# Patient Record
Sex: FEMALE | Race: WHITE | Employment: UNEMPLOYED | URBAN - METROPOLITAN AREA
[De-identification: names, ages, dates, MRNs, and addresses within clinical notes are randomized per-mention and may not be internally consistent; named-entity substitution may affect disease eponyms.]

---

## 2018-11-28 ENCOUNTER — APPOINTMENT (OUTPATIENT)
Dept: CT IMAGING | Age: 43
DRG: 897 | End: 2018-11-28

## 2018-11-28 ENCOUNTER — APPOINTMENT (OUTPATIENT)
Dept: GENERAL RADIOLOGY | Age: 43
DRG: 897 | End: 2018-11-28

## 2018-11-28 ENCOUNTER — HOSPITAL ENCOUNTER (INPATIENT)
Age: 43
LOS: 1 days | Discharge: HOME | DRG: 897 | End: 2018-11-29
Attending: EMERGENCY MEDICINE | Admitting: INTERNAL MEDICINE

## 2018-11-28 DIAGNOSIS — E87.6 HYPOKALEMIA: ICD-10-CM

## 2018-11-28 DIAGNOSIS — F10.929 ACUTE ALCOHOLIC INTOXICATION WITH COMPLICATION (HCC): Primary | ICD-10-CM

## 2018-11-28 PROBLEM — I10 HTN (HYPERTENSION): Status: ACTIVE | Noted: 2018-11-28

## 2018-11-28 PROBLEM — F10.221 ACUTE ALCOHOL INTOXICATION IN PATIENT WITH ALCOHOLISM WITH BLOOD ALCOHOL LEVEL OVER 0.3, WITH DELIRIUM (HCC): Status: ACTIVE | Noted: 2018-11-28

## 2018-11-28 PROBLEM — F41.9 ANXIETY: Status: ACTIVE | Noted: 2018-11-28
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
## 2018-11-28 LAB
A/G RATIO: 1 (ref 1.1–2.2)
ACETAMINOPHEN LEVEL: <5 UG/ML (ref 10–30)
ALBUMIN SERPL-MCNC: 3.7 G/DL (ref 3.4–5)
ALP BLD-CCNC: 89 U/L (ref 40–129)
ALT SERPL-CCNC: 19 U/L (ref 10–40)
AMMONIA: 26 UMOL/L (ref 11–51)
AMPHETAMINE SCREEN, URINE: NORMAL
ANION GAP SERPL CALCULATED.3IONS-SCNC: 17 MMOL/L (ref 3–16)
AST SERPL-CCNC: 20 U/L (ref 15–37)
BARBITURATE SCREEN URINE: NORMAL
BASOPHILS ABSOLUTE: 0 K/UL (ref 0–0.2)
BASOPHILS RELATIVE PERCENT: 0.5 %
BENZODIAZEPINE SCREEN, URINE: NORMAL
BILIRUB SERPL-MCNC: <0.2 MG/DL (ref 0–1)
BUN BLDV-MCNC: 7 MG/DL (ref 7–20)
CALCIUM SERPL-MCNC: 8.6 MG/DL (ref 8.3–10.6)
CANNABINOID SCREEN URINE: NORMAL
CHLORIDE BLD-SCNC: 103 MMOL/L (ref 99–110)
CO2: 23 MMOL/L (ref 21–32)
COCAINE METABOLITE SCREEN URINE: NORMAL
CREAT SERPL-MCNC: 0.5 MG/DL (ref 0.6–1.1)
EOSINOPHILS ABSOLUTE: 0.1 K/UL (ref 0–0.6)
EOSINOPHILS RELATIVE PERCENT: 1 %
ETHANOL: 412 MG/DL (ref 0–0.08)
GFR AFRICAN AMERICAN: >60
GFR NON-AFRICAN AMERICAN: >60
GLOBULIN: 3.7 G/DL
GLUCOSE BLD-MCNC: 100 MG/DL (ref 70–99)
HCT VFR BLD CALC: 41 % (ref 36–48)
HEMOGLOBIN: 13.5 G/DL (ref 12–16)
INR BLD: 0.93 (ref 0.86–1.14)
LIPASE: 50 U/L (ref 13–60)
LYMPHOCYTES ABSOLUTE: 4 K/UL (ref 1–5.1)
LYMPHOCYTES RELATIVE PERCENT: 53.5 %
Lab: NORMAL
MAGNESIUM: 2.1 MG/DL (ref 1.8–2.4)
MCH RBC QN AUTO: 30.4 PG (ref 26–34)
MCHC RBC AUTO-ENTMCNC: 32.9 G/DL (ref 31–36)
MCV RBC AUTO: 92.3 FL (ref 80–100)
METHADONE SCREEN, URINE: NORMAL
MONOCYTES ABSOLUTE: 0.6 K/UL (ref 0–1.3)
MONOCYTES RELATIVE PERCENT: 8.5 %
NEUTROPHILS ABSOLUTE: 2.7 K/UL (ref 1.7–7.7)
NEUTROPHILS RELATIVE PERCENT: 36.5 %
OPIATE SCREEN URINE: NORMAL
OXYCODONE URINE: NORMAL
PDW BLD-RTO: 13.8 % (ref 12.4–15.4)
PH UA: 5.5
PHENCYCLIDINE SCREEN URINE: NORMAL
PLATELET # BLD: 222 K/UL (ref 135–450)
PMV BLD AUTO: 8.7 FL (ref 5–10.5)
POTASSIUM REFLEX MAGNESIUM: 2.9 MMOL/L (ref 3.5–5.1)
PROPOXYPHENE SCREEN: NORMAL
PROTHROMBIN TIME: 10.6 SEC (ref 9.8–13)
RBC # BLD: 4.44 M/UL (ref 4–5.2)
SALICYLATE, SERUM: <0.3 MG/DL (ref 15–30)
SODIUM BLD-SCNC: 143 MMOL/L (ref 136–145)
TOTAL PROTEIN: 7.4 G/DL (ref 6.4–8.2)
WBC # BLD: 7.5 K/UL (ref 4–11)

## 2018-11-28 PROCEDURE — 2580000003 HC RX 258: Performed by: EMERGENCY MEDICINE

## 2018-11-28 PROCEDURE — 82140 ASSAY OF AMMONIA: CPT

## 2018-11-28 PROCEDURE — 80307 DRUG TEST PRSMV CHEM ANLYZR: CPT

## 2018-11-28 PROCEDURE — 96375 TX/PRO/DX INJ NEW DRUG ADDON: CPT

## 2018-11-28 PROCEDURE — 80053 COMPREHEN METABOLIC PANEL: CPT

## 2018-11-28 PROCEDURE — 2060000000 HC ICU INTERMEDIATE R&B

## 2018-11-28 PROCEDURE — G0480 DRUG TEST DEF 1-7 CLASSES: HCPCS

## 2018-11-28 PROCEDURE — 96361 HYDRATE IV INFUSION ADD-ON: CPT

## 2018-11-28 PROCEDURE — 83690 ASSAY OF LIPASE: CPT

## 2018-11-28 PROCEDURE — 6360000002 HC RX W HCPCS: Performed by: EMERGENCY MEDICINE

## 2018-11-28 PROCEDURE — 85025 COMPLETE CBC W/AUTO DIFF WBC: CPT

## 2018-11-28 PROCEDURE — 2500000003 HC RX 250 WO HCPCS: Performed by: PHYSICIAN ASSISTANT

## 2018-11-28 PROCEDURE — 2580000003 HC RX 258: Performed by: PHYSICIAN ASSISTANT

## 2018-11-28 PROCEDURE — 83735 ASSAY OF MAGNESIUM: CPT

## 2018-11-28 PROCEDURE — 93005 ELECTROCARDIOGRAM TRACING: CPT | Performed by: EMERGENCY MEDICINE

## 2018-11-28 PROCEDURE — 70450 CT HEAD/BRAIN W/O DYE: CPT

## 2018-11-28 PROCEDURE — 99285 EMERGENCY DEPT VISIT HI MDM: CPT

## 2018-11-28 PROCEDURE — 6360000002 HC RX W HCPCS: Performed by: PHYSICIAN ASSISTANT

## 2018-11-28 PROCEDURE — 2580000003 HC RX 258: Performed by: INTERNAL MEDICINE

## 2018-11-28 PROCEDURE — 71045 X-RAY EXAM CHEST 1 VIEW: CPT

## 2018-11-28 PROCEDURE — 85610 PROTHROMBIN TIME: CPT

## 2018-11-28 PROCEDURE — 96365 THER/PROPH/DIAG IV INF INIT: CPT

## 2018-11-28 RX ORDER — SODIUM CHLORIDE 0.9 % (FLUSH) 0.9 %
10 SYRINGE (ML) INJECTION PRN
Status: DISCONTINUED | OUTPATIENT
Start: 2018-11-28 | End: 2018-11-29 | Stop reason: HOSPADM

## 2018-11-28 RX ORDER — LORAZEPAM 1 MG/1
2 TABLET ORAL EVERY 4 HOURS PRN
Status: DISCONTINUED | OUTPATIENT
Start: 2018-11-28 | End: 2018-11-29 | Stop reason: HOSPADM

## 2018-11-28 RX ORDER — POTASSIUM CHLORIDE 7.45 MG/ML
10 INJECTION INTRAVENOUS PRN
Status: DISCONTINUED | OUTPATIENT
Start: 2018-11-28 | End: 2018-11-29 | Stop reason: HOSPADM

## 2018-11-28 RX ORDER — SODIUM CHLORIDE 0.9 % (FLUSH) 0.9 %
10 SYRINGE (ML) INJECTION EVERY 12 HOURS SCHEDULED
Status: DISCONTINUED | OUTPATIENT
Start: 2018-11-28 | End: 2018-11-29 | Stop reason: HOSPADM

## 2018-11-28 RX ORDER — THIAMINE MONONITRATE (VIT B1) 100 MG
100 TABLET ORAL DAILY
Status: DISCONTINUED | OUTPATIENT
Start: 2018-11-29 | End: 2018-11-29 | Stop reason: CLARIF

## 2018-11-28 RX ORDER — NALOXONE HYDROCHLORIDE 1 MG/ML
2 INJECTION INTRAMUSCULAR; INTRAVENOUS; SUBCUTANEOUS ONCE
Status: COMPLETED | OUTPATIENT
Start: 2018-11-28 | End: 2018-11-28

## 2018-11-28 RX ORDER — METOPROLOL SUCCINATE 25 MG/1
25 TABLET, EXTENDED RELEASE ORAL DAILY
Status: DISCONTINUED | OUTPATIENT
Start: 2018-11-29 | End: 2018-11-29 | Stop reason: HOSPADM

## 2018-11-28 RX ORDER — POTASSIUM CHLORIDE 7.45 MG/ML
10 INJECTION INTRAVENOUS
Status: DISPENSED | OUTPATIENT
Start: 2018-11-28 | End: 2018-11-28

## 2018-11-28 RX ORDER — FOLIC ACID 1 MG/1
1 TABLET ORAL DAILY
Status: DISCONTINUED | OUTPATIENT
Start: 2018-11-29 | End: 2018-11-29 | Stop reason: HOSPADM

## 2018-11-28 RX ORDER — POTASSIUM CHLORIDE 20 MEQ/1
40 TABLET, EXTENDED RELEASE ORAL PRN
Status: DISCONTINUED | OUTPATIENT
Start: 2018-11-28 | End: 2018-11-29 | Stop reason: HOSPADM

## 2018-11-28 RX ORDER — ONDANSETRON 2 MG/ML
4 INJECTION INTRAMUSCULAR; INTRAVENOUS EVERY 6 HOURS PRN
Status: DISCONTINUED | OUTPATIENT
Start: 2018-11-28 | End: 2018-11-29 | Stop reason: HOSPADM

## 2018-11-28 RX ORDER — 0.9 % SODIUM CHLORIDE 0.9 %
1000 INTRAVENOUS SOLUTION INTRAVENOUS ONCE
Status: COMPLETED | OUTPATIENT
Start: 2018-11-28 | End: 2018-11-28

## 2018-11-28 RX ORDER — SODIUM CHLORIDE 9 MG/ML
INJECTION, SOLUTION INTRAVENOUS CONTINUOUS
Status: DISCONTINUED | OUTPATIENT
Start: 2018-11-28 | End: 2018-11-29 | Stop reason: HOSPADM

## 2018-11-28 RX ORDER — LORAZEPAM 2 MG/ML
2 INJECTION INTRAMUSCULAR EVERY 4 HOURS PRN
Status: DISCONTINUED | OUTPATIENT
Start: 2018-11-28 | End: 2018-11-29 | Stop reason: HOSPADM

## 2018-11-28 RX ADMIN — FOLIC ACID: 5 INJECTION, SOLUTION INTRAMUSCULAR; INTRAVENOUS; SUBCUTANEOUS at 22:01

## 2018-11-28 RX ADMIN — NALOXONE HYDROCHLORIDE 2 MG: 1 INJECTION PARENTERAL at 19:10

## 2018-11-28 RX ADMIN — POTASSIUM CHLORIDE 10 MEQ: 7.46 INJECTION, SOLUTION INTRAVENOUS at 21:34

## 2018-11-28 RX ADMIN — SODIUM CHLORIDE 1000 ML: 9 INJECTION, SOLUTION INTRAVENOUS at 19:30

## 2018-11-28 RX ADMIN — Medication 10 ML: at 23:49

## 2018-11-29 VITALS
SYSTOLIC BLOOD PRESSURE: 149 MMHG | DIASTOLIC BLOOD PRESSURE: 92 MMHG | BODY MASS INDEX: 35.63 KG/M2 | WEIGHT: 213.85 LBS | TEMPERATURE: 98 F | HEIGHT: 65 IN | HEART RATE: 103 BPM | RESPIRATION RATE: 18 BRPM | OXYGEN SATURATION: 95 %

## 2018-11-29 LAB
A/G RATIO: 1 (ref 1.1–2.2)
ALBUMIN SERPL-MCNC: 3.2 G/DL (ref 3.4–5)
ALP BLD-CCNC: 73 U/L (ref 40–129)
ALT SERPL-CCNC: 27 U/L (ref 10–40)
ANION GAP SERPL CALCULATED.3IONS-SCNC: 12 MMOL/L (ref 3–16)
AST SERPL-CCNC: 40 U/L (ref 15–37)
ATYPICAL LYMPHOCYTE RELATIVE PERCENT: 12 % (ref 0–6)
BANDED NEUTROPHILS RELATIVE PERCENT: 1 % (ref 0–7)
BASOPHILS ABSOLUTE: 0 K/UL (ref 0–0.2)
BASOPHILS RELATIVE PERCENT: 1 %
BILIRUB SERPL-MCNC: <0.2 MG/DL (ref 0–1)
BILIRUBIN DIRECT: <0.2 MG/DL (ref 0–0.3)
BILIRUBIN, INDIRECT: ABNORMAL MG/DL (ref 0–1)
BUN BLDV-MCNC: 5 MG/DL (ref 7–20)
CALCIUM SERPL-MCNC: 7.5 MG/DL (ref 8.3–10.6)
CHLORIDE BLD-SCNC: 109 MMOL/L (ref 99–110)
CO2: 25 MMOL/L (ref 21–32)
CREAT SERPL-MCNC: <0.5 MG/DL (ref 0.6–1.1)
EKG ATRIAL RATE: 72 BPM
EKG DIAGNOSIS: NORMAL
EKG P AXIS: 45 DEGREES
EKG P-R INTERVAL: 138 MS
EKG Q-T INTERVAL: 408 MS
EKG QRS DURATION: 84 MS
EKG QTC CALCULATION (BAZETT): 446 MS
EKG R AXIS: 26 DEGREES
EKG T AXIS: 99 DEGREES
EKG VENTRICULAR RATE: 72 BPM
EOSINOPHILS ABSOLUTE: 0.1 K/UL (ref 0–0.6)
EOSINOPHILS RELATIVE PERCENT: 2 %
ETHANOL: 205 MG/DL (ref 0–0.08)
ETHANOL: NORMAL MG/DL (ref 0–0.08)
GFR AFRICAN AMERICAN: >60
GFR NON-AFRICAN AMERICAN: >60
GLOBULIN: 3.2 G/DL
GLUCOSE BLD-MCNC: 102 MG/DL (ref 70–99)
HCT VFR BLD CALC: 37.3 % (ref 36–48)
HEMATOLOGY PATH CONSULT: YES
HEMOGLOBIN: 12.1 G/DL (ref 12–16)
LYMPHOCYTES ABSOLUTE: 3.3 K/UL (ref 1–5.1)
LYMPHOCYTES RELATIVE PERCENT: 57 %
MCH RBC QN AUTO: 30.1 PG (ref 26–34)
MCHC RBC AUTO-ENTMCNC: 32.6 G/DL (ref 31–36)
MCV RBC AUTO: 92.4 FL (ref 80–100)
MONOCYTES ABSOLUTE: 0.1 K/UL (ref 0–1.3)
MONOCYTES RELATIVE PERCENT: 3 %
NEUTROPHILS ABSOLUTE: 1.2 K/UL (ref 1.7–7.7)
NEUTROPHILS RELATIVE PERCENT: 24 %
PDW BLD-RTO: 14 % (ref 12.4–15.4)
PLATELET # BLD: 212 K/UL (ref 135–450)
PLATELET SLIDE REVIEW: ADEQUATE
PMV BLD AUTO: 8.6 FL (ref 5–10.5)
POTASSIUM REFLEX MAGNESIUM: 3.6 MMOL/L (ref 3.5–5.1)
POTASSIUM SERPL-SCNC: 3.6 MMOL/L (ref 3.5–5.1)
RBC # BLD: 4.03 M/UL (ref 4–5.2)
SLIDE REVIEW: ABNORMAL
SODIUM BLD-SCNC: 146 MMOL/L (ref 136–145)
TOTAL CK: 72 U/L (ref 26–192)
TOTAL PROTEIN: 6.4 G/DL (ref 6.4–8.2)
WBC # BLD: 4.8 K/UL (ref 4–11)

## 2018-11-29 PROCEDURE — 2500000003 HC RX 250 WO HCPCS: Performed by: INTERNAL MEDICINE

## 2018-11-29 PROCEDURE — 85025 COMPLETE CBC W/AUTO DIFF WBC: CPT

## 2018-11-29 PROCEDURE — 2580000003 HC RX 258: Performed by: INTERNAL MEDICINE

## 2018-11-29 PROCEDURE — 80053 COMPREHEN METABOLIC PANEL: CPT

## 2018-11-29 PROCEDURE — G0480 DRUG TEST DEF 1-7 CLASSES: HCPCS

## 2018-11-29 PROCEDURE — 82550 ASSAY OF CK (CPK): CPT

## 2018-11-29 PROCEDURE — 6370000000 HC RX 637 (ALT 250 FOR IP): Performed by: INTERNAL MEDICINE

## 2018-11-29 PROCEDURE — 90686 IIV4 VACC NO PRSV 0.5 ML IM: CPT | Performed by: INTERNAL MEDICINE

## 2018-11-29 PROCEDURE — G0008 ADMIN INFLUENZA VIRUS VAC: HCPCS | Performed by: INTERNAL MEDICINE

## 2018-11-29 PROCEDURE — 93010 ELECTROCARDIOGRAM REPORT: CPT | Performed by: INTERNAL MEDICINE

## 2018-11-29 PROCEDURE — 36415 COLL VENOUS BLD VENIPUNCTURE: CPT

## 2018-11-29 PROCEDURE — 6360000002 HC RX W HCPCS: Performed by: INTERNAL MEDICINE

## 2018-11-29 RX ORDER — THIAMINE HCL 100 MG
100 TABLET ORAL DAILY
Status: DISCONTINUED | OUTPATIENT
Start: 2018-11-29 | End: 2018-11-29 | Stop reason: HOSPADM

## 2018-11-29 RX ORDER — THIAMINE HCL 100 MG
100 TABLET ORAL DAILY
Status: DISCONTINUED | OUTPATIENT
Start: 2018-11-29 | End: 2018-11-29 | Stop reason: SDUPTHER

## 2018-11-29 RX ADMIN — Medication 10 ML: at 10:52

## 2018-11-29 RX ADMIN — Medication 100 MG: at 17:01

## 2018-11-29 RX ADMIN — FOLIC ACID 1 MG: 1 TABLET ORAL at 10:51

## 2018-11-29 RX ADMIN — FOLIC ACID: 5 INJECTION, SOLUTION INTRAMUSCULAR; INTRAVENOUS; SUBCUTANEOUS at 10:52

## 2018-11-29 RX ADMIN — METOPROLOL SUCCINATE 25 MG: 25 TABLET, FILM COATED, EXTENDED RELEASE ORAL at 10:51

## 2018-11-29 RX ADMIN — SODIUM CHLORIDE: 9 INJECTION, SOLUTION INTRAVENOUS at 00:58

## 2018-11-29 RX ADMIN — INFLUENZA A VIRUS A/MICHIGAN/45/2015 X-275 (H1N1) ANTIGEN (FORMALDEHYDE INACTIVATED), INFLUENZA A VIRUS A/SINGAPORE/INFIMH-16-0019/2016 IVR-186 (H3N2) ANTIGEN (FORMALDEHYDE INACTIVATED), INFLUENZA B VIRUS B/PHUKET/3073/2013 ANTIGEN (FORMALDEHYDE INACTIVATED), AND INFLUENZA B VIRUS B/MARYLAND/15/2016 BX-69A ANTIGEN (FORMALDEHYDE INACTIVATED) 0.5 ML: 15; 15; 15; 15 INJECTION, SUSPENSION INTRAMUSCULAR at 17:04

## 2018-11-29 RX ADMIN — ENOXAPARIN SODIUM 40 MG: 40 INJECTION SUBCUTANEOUS at 10:51

## 2018-11-29 ASSESSMENT — PAIN SCALES - GENERAL
PAINLEVEL_OUTOF10: 0

## 2018-11-29 NOTE — H&P
History and Physical - Observation  Dr. Kian Croft  11/28/2018    PCP: Lisandra Menjivar MD    Cc:   Chief Complaint   Patient presents with    Drug Overdose     pt in via squad, found slumped over in car. given 6 mg Narcan, 2 IN and 4 IV. minimal response.  via squad. HPI:  Karis Kurtz is a 39 y.o. female who  has a past medical history of Anxiety and Migraine. Patient presents with Acute alcohol intoxication ([facility] [address] HPI    39 y.o. female that presents after being found unresponsive in the  seat of her car for evaluation of possible overdose. Upon further chart review, and consultation with patient's mother, patient does have history of alcohol abuse. No known history of other illicit drug use including opioids. She received total of 8 mg of Narcan IM and IV without significant response    Cannot get any further hx or ROS from pt due to intoxication    Problem list of hospitalization thus far: Active Hospital Problems    Diagnosis    Acute alcohol intoxication ([facility] [address] [F10.929]    Anxiety [F41.9]    HTN (hypertension) [I10]    Hypokalemia [E87.6]         Review of Systems: (1 system for EPF, 2-9 for detailed, 10+ for comprehensive)  Review of Systems   Unable to perform ROS: Mental status change          Past MedicalHistory:   Past Medical History:   Diagnosis Date    Anxiety     Migraine        Past Surgical History:   Past Surgical History:   Procedure Laterality Date    ECTOPIC PREGNANCY SURGERY      right tube removed       Social History:   Social History     Social History    Marital status:      Spouse name: N/A    Number of children: N/A    Years of education: N/A     Occupational History    Not on file.      Social History Main Topics    Smoking status: Never Smoker    Smokeless tobacco: Not on file    Alcohol use No    Drug use: No    Sexual activity: Not on file     Other Topics Concern    Not on file     Social History Narrative    No narrative on file CHEST 11/28/2018 8:00 pm COMPARISON: None. HISTORY: ORDERING SYSTEM PROVIDED HISTORY: shortness of breath TECHNOLOGIST PROVIDED HISTORY: Reason for exam:->shortness of breath Ordering Physician Provided Reason for Exam: Drug Overdose (pt in via squad, found slumped over in car. given 6 mg Narcan, 2 IN and 4 IV. minimal response.  via squad. ) Acuity: Unknown Type of Exam: Unknown FINDINGS: The heart size is enlarged. The pulmonary vasculature is mildly congested. No acute infiltrates are seen. No pneumothoraces are noted. 1. Cardiomegaly with mild vascular congestion. EKG:            MEDICAL DECISION MAKING:    Patient Active Hospital Problem List:   Acute alcohol intoxication ([facility] [address] (11/28/2018)    Assessment: New Problem to me. pt with etoh level > 400    Plan: admit. Social work to see. Needs rehab. Banana bag iv daily. Ivf. Keep npo until more alert. Check urine tox screen   Anxiety (11/28/2018)    Assessment: Established problem. Stable. Plan: Continue present orders/plan. HTN (hypertension) (11/28/2018)    Assessment: Established problem. Stable. 116/62    Plan: Continue present orders/plan. Hypokalemia (11/28/2018)    Assessment: New Problem to me.  k low in er    Plan: kcl ordered iv          Diagnoses as listed above, designated as new or established and plan outlined for each. The patient isbeing placed in observation status with the expectation of NOT requiring a hospital stay spanning at least two midnights for care and treatment of the problems noted in the problem list.  This determination is alsobased on the patients comorbidities and past medical history, the severity and timing of the signs and symptoms upon presentation.         Electronically signed by: Radha Koroma 11/28/2018

## 2018-11-29 NOTE — PROGRESS NOTES
mg, Subcutaneous, Daily  LORazepam (ATIVAN) injection 2 mg, 2 mg, Intravenous, Q4H PRN  LORazepam (ATIVAN) tablet 2 mg, 2 mg, Oral, Q4H PRN  potassium chloride (KLOR-CON M) extended release tablet 40 mEq, 40 mEq, Oral, PRN **OR** potassium bicarb-citric acid (EFFER-K) effervescent tablet 40 mEq, 40 mEq, Oral, PRN **OR** potassium chloride 10 mEq/100 mL IVPB (Peripheral Line), 10 mEq, Intravenous, PRN  vitamin B-1 (THIAMINE) tablet 100 mg, 100 mg, Oral, Daily  folic acid (FOLVITE) tablet 1 mg, 1 mg, Oral, Daily         Objective:  BP (!) 117/58   Pulse 93   Temp 97.2 °F (36.2 °C) (Temporal)   Resp 18   Ht 5' 5\" (1.651 m)   Wt 213 lb 13.5 oz (97 kg)   SpO2 93%   BMI 35.59 kg/m²      Patient Vitals for the past 24 hrs:   BP Temp Temp src Pulse Resp SpO2 Height Weight   11/29/18 0700 (!) 117/58 - - 93 - 93 % - -   11/29/18 0600 116/66 - - 96 18 95 % - 213 lb 13.5 oz (97 kg)   11/29/18 0500 106/65 - - 84 17 93 % - -   11/29/18 0400 (!) 105/55 97.2 °F (36.2 °C) Temporal 90 18 96 % - -   11/29/18 0300 (!) 105/56 - - 86 14 91 % - -   11/29/18 0200 (!) 116/56 - - 91 14 98 % - -   11/29/18 0100 113/71 - - 82 14 100 % - -   11/29/18 0045 - - - - - 98 % - -   11/29/18 0000 111/70 97.9 °F (36.6 °C) Temporal 76 13 95 % - -   11/28/18 2315 115/66 97.9 °F (36.6 °C) Temporal 78 15 99 % - 211 lb 6.7 oz (95.9 kg)   11/28/18 2230 116/73 - - 87 20 93 % - -   11/28/18 2215 114/79 - - 96 20 97 % - -   11/28/18 2212 - - - 94 20 98 % - -   11/28/18 2150 119/70 - - 91 21 96 % - -   11/28/18 2130 120/63 - - - - 96 % - -   11/28/18 2115 (!) 128/51 - - - - 91 % - -   11/28/18 2103 - - - 86 13 94 % - -   11/28/18 2102 116/62 98 °F (36.7 °C) - 94 19 - - -   11/28/18 2049 (!) 138/48 - - 102 21 96 % - -   11/28/18 2015 (!) 99/39 - - 81 13 (!) 86 % - -   11/28/18 1953 - - - 86 21 100 % - -   11/28/18 1930 94/71 - - 90 20 96 % - -   11/28/18 1918 106/66 - - 81 18 - - -   11/28/18 1916 - - Oral 71 12 100 % 5' 5\" (1.651 m) 215 lb (97.5 kg)

## 2018-11-29 NOTE — ED PROVIDER NOTES
Emergency [address] EMERGENCY DEPARTMENT    Patient: Judge Haro  MRN: [id number]  : 1975  Date of Evaluation: 2018  ED Provider: Valentino Stark, MD    Chief Complaint     No chief complaint on file. Josh Souza is a 39 y.o. female who presents to the emergency department   This is a 70-year-old female brought to emergency department for evaluation of change in mental status. According to EMS patient brought to emergency department after being found in a field after reportedly driving erratically. She was acting confused and somnolent. She was given 6 of Narcan in the field with no significant improvement. No reported trauma unknown if she's had recent changes of diabetic medications no reported fevers. ROS:     At least 10 systems reviewed and otherwise acutely negative except as in the [address] Past History     Past Medical History:   Diagnosis Date    Anxiety     Migraine      Past Surgical History:   Procedure Laterality Date    ECTOPIC PREGNANCY SURGERY      right tube removed     Social History     Social History    Marital status:      Spouse name: N/A    Number of children: N/A    Years of education: N/A     Social History Main Topics    Smoking status: Never Smoker    Smokeless tobacco: Not on file    Alcohol use No    Drug use: No    Sexual activity: Not on file     Other Topics Concern    Not on file     Social History Narrative    No narrative on file       Medications/Allergies     Previous Medications    ALPRAZOLAM (XANAX) 2 MG TABLET    Take 2 mg by mouth daily. METOPROLOL (TOPROL-XL) 25 MG XL TABLET    Take 25 mg by mouth daily.        Allergies   Allergen Reactions    Latex         Physical Exam       ED Triage Vitals   BP Temp Temp src Pulse Resp SpO2 Height Weight   -- -- -- -- -- -- -- --     General: Well-appearing well-nourished female in no acute distress  HEENT:  PERRLA, oropharynx moist evidence of trauma noted on examination. Did review patient's previous visits to the emergency room. Does have evidence of alcohol abuse with similar presentations in the past.    ED Medication Orders     Start Ordered     Status Ordering Provider    11/28/18 2130 11/28/18 2117  sodium chloride 0.9 % 5,794 mL with folic acid 1 mg, adult multi-vitamin with vitamin k 10 mL, thiamine 100 mg  ONCE      Ordered JESI ARMSTRONG    11/28/18 2015 11/28/18 2008  potassium chloride 10 mEq/100 mL IVPB (Peripheral Line)  EVERY HOUR      Acknowledged KHUSHBOO GILMORE    11/28/18 1930 11/28/18 1915  0.9 % sodium chloride bolus  ONCE      Last MAR action:  New Bag - by Ralph Guerni on 11/28/18 at [address] [place], [place] [address]    11/28/18 1915 11/28/18 1913  naloxone (NARCAN) injection 2 mg  ONCE      Last MAR action:  Given - by Ralph Guerin on 11/28/18 at [facility]          Final Impression    No diagnosis found.   DISPOSITION       (Please note that portions of this note may have been completed with a voice recognition program. Efforts were made to edit the dictations but occasionally words are mis-transcribed.)    Km Canela MD  [address] [facility]       Km Canela MD  11/28/18 2119

## 2018-11-29 NOTE — ED NOTES
Bed alarm on and in place, shane wyatt at bedside to keep patient safe.       Krystle Rees RN  11/28/18 0375

## 2018-11-29 NOTE — ED PROVIDER NOTES
[address] [facility]  eMERGENCY dEPARTMENT eNCOUnter        Pt Name: Laura Gonzalez  MRN: [id number]  [place] 1975  Date of evaluation: 11/28/2018  Provider: John Benitez PA-C  PCP: Alyx Farias MD    This patient was seen and evaluated by the attending physician Konrad Gutierrez MD.        [address] [place] [address]       Chief Complaint   Patient presents with    Drug Overdose     pt in via squad, found slumped over in car. given 6 mg Narcan, 2 IN and 4 IV. minimal response.  via squad. HISTORY OF PRESENT ILLNESS   (Location/Symptom, Timing/Onset, Context/Setting, Quality, Duration, Modifying Factors, Severity)  Note limiting factors. Laura Gonzalez is a 39 y.o. female that presents after being found unresponsive in the  seat of her car for evaluation of possible overdose. Upon further chart review, and consultation with patient's mother, patient does have history of alcohol abuse. No known history of other illicit drug use including opioids. She received total of 8 mg of Narcan IM and IV without significant response. No additional information able to be obtained at this time. Nursing Notes were all reviewed and agreed with or any disagreements were addressed  in the HPI. REVIEW OF SYSTEMS    (2-9 systems for level 4, 10 or more for level 5)     Review of Systems   Unable to perform ROS: Patient unresponsive       Positives and Pertinent negatives as per HPI. Except as noted abovein the ROS, all other systems were reviewed and negative. PAST MEDICAL HISTORY     Past Medical History:   Diagnosis Date    Anxiety     Migraine          SURGICAL HISTORY     Past Surgical History:   Procedure Laterality Date    ECTOPIC PREGNANCY SURGERY      right tube removed         CURRENTMEDICATIONS       Previous Medications    ALPRAZOLAM (XANAX) 2 MG TABLET    Take 2 mg by mouth daily.       METOPROLOL (TOPROL-XL) 25 MG XL TABLET    Take 25 mg by mouth daily. ALLERGIES     Latex    FAMILYHISTORY     No family history on file. SOCIAL HISTORY       Social History     Social History    Marital status:      Spouse name: N/A    Number of children: N/A    Years of education: N/A     Social History Main Topics    Smoking status: Never Smoker    Smokeless tobacco: Not on file    Alcohol use No    Drug use: No    Sexual activity: Not on file     Other Topics Concern    Not on file     Social History Narrative    No narrative on file       SCREENINGS             PHYSICAL EXAM    (up to 7 for level 4, 8 or more for level 5)     ED Triage Vitals [11/28/18 1916]   BP Temp Temp Source Pulse Resp SpO2 Height Weight   -- -- Oral 71 12 100 % 5' 5\" (1.651 m) 215 lb (97.5 kg)       Physical Exam   Constitutional: She appears well-developed and well-nourished. HENT:   Head: Normocephalic and atraumatic. Right Ear: External ear normal.   Left Ear: External ear normal.   Nose: Nose normal.   Eyes: Right eye exhibits no discharge. Left eye exhibits no discharge. Neck: Normal range of motion. Neck supple. Cardiovascular: Normal rate, regular rhythm and normal heart sounds. No murmur heard. Pulmonary/Chest: Effort normal and breath sounds normal. No respiratory distress. She has no wheezes. She has no rales. Abdominal: Soft. Musculoskeletal: Normal range of motion. Neurological: She is unresponsive. Skin: Skin is warm and dry. She is not diaphoretic. Psychiatric: She has a normal mood and affect. Her behavior is normal.   Nursing note and vitals reviewed.       DIAGNOSTIC RESULTS   LABS:    Labs Reviewed   COMPREHENSIVE METABOLIC PANEL W/ REFLEX TO MG FOR LOW K - Abnormal; Notable for the following:        Result Value    Potassium reflex Magnesium 2.9 (*)     Anion Gap 17 (*)     Glucose 100 (*)     CREATININE 0.5 (*)     Albumin/Globulin Ratio 1.0 (*)     All other components within normal limits    Narrative:     CALL  [place]  [facility] tel. [phone],  Chemistry results called to and read back by MARY ELLEN Reaves, 11/28/2018  20:06, by Yariel Yang  Performed at:  [facility] [address], [facility], [address]   Phone [phone]   SALICYLATE LEVEL - Abnormal; Notable for the following:     Salicylate, Serum <0.7 (*)     All other components within normal limits    Narrative:     Kit August  [facility] tel. [phone],  Chemistry results called to and read back by MARY ELLEN Reaves, 11/28/2018  20:06, by Yariel Yang  Performed at:  [facility] [address], [facility], [address]   Phone [phone]   ACETAMINOPHEN LEVEL - Abnormal; Notable for the following:     Acetaminophen Level <5 (*)     All other components within normal limits    Narrative:     Kit Traore  [facility] tel. [phone],  Chemistry results called to and read back by MARY ELLEN Reaves, 11/28/2018  20:06, by Yariel Yang  Performed at:  [facility] [address], [facility], [address]   Phone [phone]   CBC WITH AUTO DIFFERENTIAL    Narrative:     Performed at:  [facility] [address], [facility], [address]   Phone [phone]   LIPASE    Narrative:     Kit Traore  [facility] tel. [phone],  Chemistry results called to and read back by MARY ELLEN Reaves, 11/28/2018  20:06, by Yariel Yang  Performed at:  [facility] [address], [facility], [address]   Phone [phone]   PROTIME-INR    Narrative:     Performed at:  [facility] [address], [facility], [address]   Phone [phone]   AMMONIA    Narrative:     Performed at:  [facility] [address], [facility], [address]   Phone [phone]   URINE DRUG SCREEN    Narrative:     Performed at:  [facility] [address], [facility], [address]   Phone [phone]   ETHANOL

## 2018-11-29 NOTE — ED NOTES
[place], [place] at bedside. Pt waking up and pulling at lines, unable to redirect. New order for bilateral soft wrist restraints.       Jaclyn Dugan RN  11/28/18 2417

## 2018-12-07 NOTE — DISCHARGE SUMMARY
[facility]    Imaging Results:  Ct Head Wo Contrast    Result Date: 11/28/2018  EXAMINATION: CT OF THE HEAD WITHOUT CONTRAST  11/28/2018 8:46 pm TECHNIQUE: CT of the head was performed without the administration of intravenous contrast. Dose modulation, iterative reconstruction, and/or weight based adjustment of the mA/kV was utilized to reduce the radiation dose to as low as reasonably achievable. COMPARISON: None. HISTORY: ORDERING SYSTEM PROVIDED HISTORY: CONFUSION/DELIRIUM, ALTERED LOC, UNEXPLAINED TECHNOLOGIST PROVIDED HISTORY: Has a \"code stroke\" or \"stroke alert\" been called? ->No Ordering Physician Provided Reason for Exam: Drug Overdose (pt in via squad, found slumped over in car. given 6 mg Narcan, 2 IN and 4 IV. minimal response.  via squad. ) FINDINGS: BRAIN/VENTRICLES: There is no acute intracranial hemorrhage, mass effect or midline shift. No abnormal extra-axial fluid collection. The gray-white differentiation is maintained without evidence of an acute infarct. There is no evidence of hydrocephalus. ORBITS: The visualized portion of the orbits demonstrate no acute abnormality. SINUSES: The visualized paranasal sinuses and mastoid air cells demonstrate no acute abnormality. SOFT TISSUES/SKULL:  No acute abnormality of the visualized skull or soft tissues. No acute intracranial abnormality. Xr Chest Portable    Result Date: 11/28/2018  EXAMINATION: SINGLE XRAY VIEW OF THE CHEST 11/28/2018 8:00 pm COMPARISON: None. HISTORY: ORDERING SYSTEM PROVIDED HISTORY: shortness of breath TECHNOLOGIST PROVIDED HISTORY: Reason for exam:->shortness of breath Ordering Physician Provided Reason for Exam: Drug Overdose (pt in via squad, found slumped over in car. given 6 mg Narcan, 2 IN and 4 IV. minimal response.  via squad. ) Acuity: Unknown Type of Exam: Unknown FINDINGS: The heart size is enlarged. The pulmonary vasculature is mildly congested. No acute infiltrates are seen.   No pneumothoraces are noted.     1. Cardiomegaly with mild vascular congestion. Discharge Exam:  BP (!) 149/92   Pulse 103   Temp 98 °F (36.7 °C)   Resp 18   Ht 5' 5\" (1.651 m)   Wt 213 lb 13.5 oz (97 kg)   SpO2 95%   BMI 35.59 kg/m²   General appearance: alert, appears stated age and cooperative  Head: Normocephalic, without obvious abnormality, atraumatic  Lungs: clear to auscultation bilaterally  Heart: regular rate and rhythm, S1, S2 normal, no murmur, click, rub or gallop  Abdomen: soft, non-tender; bowel sounds normal; no masses,  no organomegaly  Extremities: extremities normal, atraumatic, no cyanosis or edema  Pulses: 2+ and symmetric    Disposition: home    Condition: stable    Discharge Medications:   Eneida Berman   Home Medication Instructions [id number]    Printed on:12/07/18 5467   Medication Information                      alprazolam (XANAX) 2 MG tablet  Take 2 mg by mouth daily. metoprolol (TOPROL-XL) 25 MG XL tablet  Take 25 mg by mouth daily. Allergies: Allergies   Allergen Reactions    Latex Itching       Follow up Instructions: Follow-up with PCP: Giovani Park MD in 2-4 wk .       Total time spent on day of discharge including face-to-face visit, examination, documentation, counseling, preparation of discharge plans and followup, and discharge medicine reconciliation and presciptions is 33 minutes    Signed:  Percy Bradley MD  12/7/2018

## 2020-03-25 ENCOUNTER — HOSPITAL ENCOUNTER (EMERGENCY)
Age: 45
Discharge: HOME | End: 2020-03-26
Attending: EMERGENCY MEDICINE
Payer: MEDICARE

## 2020-03-25 ENCOUNTER — APPOINTMENT (OUTPATIENT)
Dept: CT IMAGING | Age: 45
End: 2020-03-25
Payer: MEDICARE

## 2020-03-25 ENCOUNTER — APPOINTMENT (OUTPATIENT)
Dept: GENERAL RADIOLOGY | Age: 45
End: 2020-03-25
Payer: MEDICARE

## 2020-03-25 LAB
ANION GAP SERPL CALCULATED.3IONS-SCNC: 13 MMOL/L (ref 3–16)
BASOPHILS ABSOLUTE: 0.1 K/UL (ref 0–0.2)
BASOPHILS RELATIVE PERCENT: 0.6 %
BUN BLDV-MCNC: 8 MG/DL (ref 7–20)
CALCIUM SERPL-MCNC: 8.7 MG/DL (ref 8.3–10.6)
CHLORIDE BLD-SCNC: 101 MMOL/L (ref 99–110)
CO2: 23 MMOL/L (ref 21–32)
CREAT SERPL-MCNC: 0.5 MG/DL (ref 0.6–1.1)
EOSINOPHILS ABSOLUTE: 0.2 K/UL (ref 0–0.6)
EOSINOPHILS RELATIVE PERCENT: 1.7 %
ETHANOL: 358 MG/DL (ref 0–0.08)
GFR AFRICAN AMERICAN: >60
GFR NON-AFRICAN AMERICAN: >60
GLUCOSE BLD-MCNC: 106 MG/DL (ref 70–99)
HCT VFR BLD CALC: 38.4 % (ref 36–48)
HEMOGLOBIN: 12.6 G/DL (ref 12–16)
LYMPHOCYTES ABSOLUTE: 3.4 K/UL (ref 1–5.1)
LYMPHOCYTES RELATIVE PERCENT: 37.7 %
MAGNESIUM: 2.4 MG/DL (ref 1.8–2.4)
MCH RBC QN AUTO: 31.1 PG (ref 26–34)
MCHC RBC AUTO-ENTMCNC: 32.8 G/DL (ref 31–36)
MCV RBC AUTO: 94.8 FL (ref 80–100)
MONOCYTES ABSOLUTE: 0.5 K/UL (ref 0–1.3)
MONOCYTES RELATIVE PERCENT: 5.4 %
NEUTROPHILS ABSOLUTE: 5 K/UL (ref 1.7–7.7)
NEUTROPHILS RELATIVE PERCENT: 54.6 %
PDW BLD-RTO: 13.9 % (ref 12.4–15.4)
PLATELET # BLD: 224 K/UL (ref 135–450)
PMV BLD AUTO: 8.7 FL (ref 5–10.5)
POTASSIUM REFLEX MAGNESIUM: 3.3 MMOL/L (ref 3.5–5.1)
PRO-BNP: 55 PG/ML (ref 0–124)
RBC # BLD: 4.06 M/UL (ref 4–5.2)
SODIUM BLD-SCNC: 137 MMOL/L (ref 136–145)
TROPONIN: <0.01 NG/ML
WBC # BLD: 9.2 K/UL (ref 4–11)

## 2020-03-25 PROCEDURE — 99284 EMERGENCY DEPT VISIT MOD MDM: CPT

## 2020-03-25 PROCEDURE — 70450 CT HEAD/BRAIN W/O DYE: CPT

## 2020-03-25 PROCEDURE — 2500000003 HC RX 250 WO HCPCS: Performed by: NURSE PRACTITIONER

## 2020-03-25 PROCEDURE — 80048 BASIC METABOLIC PNL TOTAL CA: CPT

## 2020-03-25 PROCEDURE — 83735 ASSAY OF MAGNESIUM: CPT

## 2020-03-25 PROCEDURE — 84484 ASSAY OF TROPONIN QUANT: CPT

## 2020-03-25 PROCEDURE — 85025 COMPLETE CBC W/AUTO DIFF WBC: CPT

## 2020-03-25 PROCEDURE — G0480 DRUG TEST DEF 1-7 CLASSES: HCPCS

## 2020-03-25 PROCEDURE — 83880 ASSAY OF NATRIURETIC PEPTIDE: CPT

## 2020-03-25 PROCEDURE — 2580000003 HC RX 258: Performed by: NURSE PRACTITIONER

## 2020-03-25 PROCEDURE — 36415 COLL VENOUS BLD VENIPUNCTURE: CPT

## 2020-03-25 PROCEDURE — 93005 ELECTROCARDIOGRAM TRACING: CPT | Performed by: NURSE PRACTITIONER

## 2020-03-25 PROCEDURE — 6360000002 HC RX W HCPCS: Performed by: NURSE PRACTITIONER

## 2020-03-25 RX ADMIN — FOLIC ACID: 5 INJECTION, SOLUTION INTRAMUSCULAR; INTRAVENOUS; SUBCUTANEOUS at 22:40

## 2020-03-26 ENCOUNTER — APPOINTMENT (OUTPATIENT)
Dept: GENERAL RADIOLOGY | Age: 45
End: 2020-03-26
Payer: MEDICARE

## 2020-03-26 VITALS
OXYGEN SATURATION: 98 % | HEIGHT: 65 IN | BODY MASS INDEX: 35.59 KG/M2 | DIASTOLIC BLOOD PRESSURE: 70 MMHG | HEART RATE: 79 BPM | SYSTOLIC BLOOD PRESSURE: 120 MMHG | RESPIRATION RATE: 18 BRPM | TEMPERATURE: 98 F

## 2020-03-26 LAB
EKG ATRIAL RATE: 89 BPM
EKG DIAGNOSIS: NORMAL
EKG P AXIS: 61 DEGREES
EKG P-R INTERVAL: 132 MS
EKG Q-T INTERVAL: 400 MS
EKG QRS DURATION: 84 MS
EKG QTC CALCULATION (BAZETT): 486 MS
EKG R AXIS: 70 DEGREES
EKG T AXIS: 103 DEGREES
EKG VENTRICULAR RATE: 89 BPM

## 2020-03-26 PROCEDURE — 2580000003 HC RX 258: Performed by: EMERGENCY MEDICINE

## 2020-03-26 PROCEDURE — 71045 X-RAY EXAM CHEST 1 VIEW: CPT

## 2020-03-26 PROCEDURE — 93010 ELECTROCARDIOGRAM REPORT: CPT | Performed by: INTERNAL MEDICINE

## 2020-03-26 PROCEDURE — 96374 THER/PROPH/DIAG INJ IV PUSH: CPT

## 2020-03-26 PROCEDURE — 6360000002 HC RX W HCPCS

## 2020-03-26 RX ORDER — SODIUM CHLORIDE 9 MG/ML
INJECTION, SOLUTION INTRAVENOUS ONCE
Status: DISCONTINUED | OUTPATIENT
Start: 2020-03-26 | End: 2020-03-26

## 2020-03-26 RX ORDER — METOCLOPRAMIDE HYDROCHLORIDE 5 MG/ML
10 INJECTION INTRAMUSCULAR; INTRAVENOUS ONCE
Status: COMPLETED | OUTPATIENT
Start: 2020-03-26 | End: 2020-03-26

## 2020-03-26 RX ORDER — 0.9 % SODIUM CHLORIDE 0.9 %
1000 INTRAVENOUS SOLUTION INTRAVENOUS ONCE
Status: COMPLETED | OUTPATIENT
Start: 2020-03-26 | End: 2020-03-26

## 2020-03-26 RX ORDER — METOCLOPRAMIDE HYDROCHLORIDE 5 MG/ML
INJECTION INTRAMUSCULAR; INTRAVENOUS
Status: COMPLETED
Start: 2020-03-26 | End: 2020-03-26

## 2020-03-26 RX ADMIN — SODIUM CHLORIDE 1000 ML: 9 INJECTION, SOLUTION INTRAVENOUS at 05:13

## 2020-03-26 RX ADMIN — METOCLOPRAMIDE HYDROCHLORIDE 10 MG: 5 INJECTION INTRAMUSCULAR; INTRAVENOUS at 05:10

## 2020-03-26 NOTE — ED PROVIDER NOTES
[facility]        Pt Name: Gautam Fong  MRN: [id number]  [place] 1975  Date of evaluation: 3/25/2020  Provider: PETTY Holley CNP  PCP: Gonzalez Miranda MD    This patient was seen and evaluated by the attending physician Elma Silva MD.    [address] [place] [address]       Chief Complaint   Patient presents with    Alcohol Intoxication     pt was found asleep outside of [employer], employees state that she came into the liquor store multiple times and bought Vodka. HISTORY OF PRESENT ILLNESS   (Location/Symptom, Timing/Onset,Context/Setting, Quality, Duration, Modifying Factors, Severity)  Note limiting factors. Gautam Fong is a 54 y.o. female who presents emergency department with concern for acute alcohol intoxication. Found asleep outside of [employer] gym's. EMS was called. The staff reports seeing her, and imbibe vodka multiple times. The patient is non-verbal on my evaluation with unequal pupils. She is not responding to commands but does open her eyes spontaneously and turns over in bed. No family or care providers at bedside to help provide further history. Nursing Notes triage note reviewed and agreed with or any disagreements were addressed  in the HPI. REVIEW OF SYSTEMS    (2-9 systems for level 4, 10 or more for level 5)     Review of Systems   Unable to perform ROS: Patient nonverbal       Positives and Pertinent negatives as per HPI. Except as noted above in the ROS, all other systems were reviewed and negative. PAST MEDICAL HISTORY     Past Medical History:   Diagnosis Date    Anxiety     Migraine          SURGICAL HISTORY       Past Surgical History:   Procedure Laterality Date    ECTOPIC PREGNANCY SURGERY      right tube removed         CURRENT MEDICATIONS       Previous Medications    ALPRAZOLAM (XANAX) 2 MG TABLET    Take 2 mg by mouth daily.       METOPROLOL (TOPROL-XL) 25 left   Neck:      Musculoskeletal: Neck supple. Cardiovascular:      Rate and Rhythm: Normal rate and regular rhythm. Heart sounds: Normal heart sounds. No murmur. No friction rub. No gallop. Pulmonary:      Effort: Pulmonary effort is normal. No respiratory distress. Breath sounds: Normal breath sounds. No stridor. No wheezing or rales. Chest:      Chest wall: No tenderness. Abdominal:      General: Bowel sounds are normal. There is no distension. Palpations: Abdomen is soft. There is no mass. Tenderness: There is no abdominal tenderness. There is no guarding or rebound. Skin:     General: Skin is warm and dry. Neurological:      Mental Status: She is alert. GCS: GCS eye subscore is 4. GCS verbal subscore is 1. GCS motor subscore is 4.          DIAGNOSTIC RESULTS   LABS:    Labs Reviewed   BASIC METABOLIC PANEL W/ REFLEX TO MG FOR LOW K - Abnormal; Notable for the following components:       Result Value    Potassium reflex Magnesium 3.3 (*)     Glucose 106 (*)     CREATININE 0.5 (*)     All other components within normal limits    Narrative:     Performed at:  [facility] [address], [place]   Phone [phone]   ETHANOL    Narrative:     Performed at:  [facility] [address] [facility] [place]   Phone [phone]   CBC WITH AUTO DIFFERENTIAL    Narrative:     Performed at:  [facility] [address], [place]   Phone [phone]   TROPONIN    Narrative:     Performed at:  [facility] [address] [facility] [place], [place]   Phone [phone]   BRAIN NATRIURETIC PEPTIDE    Narrative:     Performed at:  [facility] [address] [facility] [place] [place]   Phone [phone]   MAGNESIUM    Narrative:     Performed at:  [facility]  [address], [address]   Phone [phone]   URINE DRUG SCREEN       All other labs werewithin normal range or not returned as of this dictation. EKG: All EKG's are interpreted by the Emergency Department Physician who either signs or Co-signs this chart in the absence of acardiologist.  Please see their note for interpretation of EKG. RADIOLOGY:   Interpretation per the Radiologist below, if available at the time of this note:    CT Head WO Contrast   Final Result   No acute intracranial abnormality. XR CHEST PORTABLE    (Results Pending)     No results found. PROCEDURES   Unless otherwise noted below, none     Procedures    CRITICAL CARE TIME     n/a    CONSULTS:  None      EMERGENCY DEPARTMENT COURSE and DIFFERENTIAL DIAGNOSIS/MDM:   Vitals:    Vitals:    03/26/20 0036 03/26/20 0037 03/26/20 0038 03/26/20 0039   BP:       Pulse:       Resp:       Temp:       TempSrc:       SpO2: 92% 93% 93% 92%   Height:           Eneida Berman was given the following medications:  Medications   sodium chloride 0.9 % 9,451 mL with folic acid 1 mg, adult multi-vitamin with vitamin k 10 mL, thiamine 300 mg ( Intravenous Stopped 3/26/20 0010)       Eneida Berman was evaluated in the emergency department with concern for altered mental status. Ethanol level is 358. Concerning for acute intoxication. Laboratory evaluation was performed as well as CT imaging of the head. This is thus far unremarkable. Urine drug screen is pending. At this time the patient is resting comfortably. She is not sober enough for discharge. She did receive banana bag fluids in the ER. As it is the end of my shift, my attending will follow up on the urine results and disposition the patient. Please see attending note for further MDM, consults, and disposition. The patient tolerated their visit well.   They were seen and evaluated by the attending physician, Burton Carcamo MD , who agreed with the assessment and plan. The patient and / or the family were informed of the results of any tests, a time was given to answer questions, a plan was proposed and they agreed with plan. FINAL IMPRESSION      1.  Acute alcoholic intoxication without complication [facility])          DISPOSITION/PLAN   DISPOSITION Ed Observation 03/25/2020 11:32:03 PM        (Please note that portions of this note were completed with a voice recognition program.  Efforts were made to edit the dictations but occasionally words are mis-transcribed.)    PETTY Link CNP (electronically signed)        PETTY Link CNP  03/26/20 7379

## 2020-03-26 NOTE — ED NOTES
Bed: 14  Expected date:   Expected time:   Means of arrival:   Comments:  [address] [place], RN  03/25/20 2128

## 2020-03-26 NOTE — ED NOTES
Lyft ordered by Clinical and okay'd by Aleksandr Avitia. Family unable to come get pt at this time. Pt A&O and able to ambulate with steady gait. Pt also agreeable to take Lyft services for ride home. ETA 9 min, in a [address] [place] [address].        Sampson Morrison RN  03/26/20 7092

## 2020-03-26 NOTE — ED NOTES
Pt d/c instructions given, v/u. IV removed without complications. A&O with no signs of distress. Denies needs at this time. Pt ambulated to exit.         Koki Dee RN  03/26/20 5813

## 2020-03-26 NOTE — ED PROVIDER NOTES
I independently performed a history and physical on Eneida Berman. All diagnostic, treatment, and disposition decisions were made by myself in conjunction with the advanced practice provider. Briefly, this is a 54 y.o. female here for concern for alcohol intoxication. Staff at [employer] saw her drinking vodka and was found sleeping outside [employer]. Patient refuses to speak at this time. .    On exam,   General: Patient is in no acute distress. Skin: No cyanosis  HEENT: Moist mucous membranes  Heart: Regular rate, regular rhythm  Lung: No respiratory distress  Abdomen: Soft, nontender  Neuro: Moving all extremities, no facial droop, no slurred speech        EKG  The Ekg interpreted by me in the absence of a cardiologist shows. Normal sinus rhythm  No acute ST changes or T wave abnormalities     Screenings            MDM  Patient is a 30-year-old woman who presents with alcohol intoxication. Will obtain CT head and blood work and give IV fluids. After patient was observed, she began talking. Stated that she needed to use the bathroom. Was given a bedpan. No slurred speech    Reassessed the patient at 5 AM.  Much more awake. No slurred speech. States that she has a hangover. Does not remember how much vodka she had. Has no other complaints. Reassessed the patient at 7 AM.  She is fully awake. Shows no clinical signs of intoxication. Ambulated the patient she is ambulating at her baseline which is with a limp from an old injury    Patient Referrals:  Ricky Odell MD  [facility]  [phone]    In 1 day        Discharge Medications:  New Prescriptions    No medications on file       FINAL IMPRESSION  1. Acute alcoholic intoxication without complication (HCC)        Blood pressure 116/69, pulse 86, temperature 98 °F (36.7 °C), temperature source Oral, resp. rate 16, height 5' 5\" (1.651 m), SpO2 98 %.      For further details of Eneida Berman's emergency department encounter, please see documentation by advanced practice provider, Miguel Gutierrez.        Caroline Wall MD  03/26/20 8292

## 2024-05-03 ENCOUNTER — APPOINTMENT (OUTPATIENT)
Dept: CT IMAGING | Age: 49
DRG: 720 | End: 2024-05-03
Payer: COMMERCIAL

## 2024-05-03 ENCOUNTER — HOSPITAL ENCOUNTER (INPATIENT)
Age: 49
LOS: 6 days | Discharge: HOME HEALTH | DRG: 720 | End: 2024-05-09
Attending: STUDENT IN AN ORGANIZED HEALTH CARE EDUCATION/TRAINING PROGRAM | Admitting: INTERNAL MEDICINE
Payer: COMMERCIAL

## 2024-05-03 ENCOUNTER — APPOINTMENT (OUTPATIENT)
Dept: MRI IMAGING | Age: 49
DRG: 720 | End: 2024-05-03
Payer: COMMERCIAL

## 2024-05-03 DIAGNOSIS — E80.6 HYPERBILIRUBINEMIA: ICD-10-CM

## 2024-05-03 DIAGNOSIS — F10.10 ALCOHOL ABUSE: Primary | ICD-10-CM

## 2024-05-03 DIAGNOSIS — D64.9 ANEMIA, UNSPECIFIED TYPE: ICD-10-CM

## 2024-05-03 DIAGNOSIS — R60.0 EDEMA OF BOTH LEGS: ICD-10-CM

## 2024-05-03 DIAGNOSIS — E87.6 HYPOKALEMIA: ICD-10-CM

## 2024-05-03 DIAGNOSIS — N30.00 ACUTE CYSTITIS WITHOUT HEMATURIA: ICD-10-CM

## 2024-05-03 PROBLEM — R17 JAUNDICE: Status: ACTIVE | Noted: 2024-05-03

## 2024-05-03 PROBLEM — A41.9 SEPSIS SECONDARY TO UTI (HCC): Status: ACTIVE | Noted: 2024-05-03

## 2024-05-03 PROBLEM — M54.9 BACK PAIN: Status: ACTIVE | Noted: 2024-05-03

## 2024-05-03 PROBLEM — N39.0 SEPSIS SECONDARY TO UTI (HCC): Status: ACTIVE | Noted: 2024-05-03

## 2024-05-03 PROBLEM — A41.9 SEPSIS (HCC): Status: ACTIVE | Noted: 2024-05-03

## 2024-05-03 PROBLEM — N39.0 UTI (URINARY TRACT INFECTION): Status: ACTIVE | Noted: 2024-05-03

## 2024-05-03 LAB
ABO + RH BLD: NORMAL
ALBUMIN SERPL-MCNC: 2.9 G/DL (ref 3.4–5)
ALP SERPL-CCNC: 685 U/L (ref 40–129)
ALT SERPL-CCNC: 19 U/L (ref 10–40)
AMORPHOUS: PRESENT
ANION GAP SERPL CALCULATED.3IONS-SCNC: 18 MMOL/L (ref 3–16)
AST SERPL-CCNC: 56 U/L (ref 15–37)
BACTERIA URNS QL MICRO: ABNORMAL /HPF
BASOPHILS # BLD: 0 K/UL (ref 0–0.2)
BASOPHILS NFR BLD: 0.2 %
BILIRUB DIRECT SERPL-MCNC: 2.3 MG/DL (ref 0–0.3)
BILIRUB INDIRECT SERPL-MCNC: 1 MG/DL (ref 0–1)
BILIRUB SERPL-MCNC: 3.3 MG/DL (ref 0–1)
BILIRUB UR QL STRIP.AUTO: ABNORMAL
BLD GP AB SCN SERPL QL: NORMAL
BLOOD BANK DISPENSE STATUS: NORMAL
BLOOD BANK PRODUCT CODE: NORMAL
BPU ID: NORMAL
BUN SERPL-MCNC: 7 MG/DL (ref 7–20)
CALCIUM SERPL-MCNC: 8.4 MG/DL (ref 8.3–10.6)
CHARACTER UR: ABNORMAL
CHLORIDE SERPL-SCNC: 87 MMOL/L (ref 99–110)
CLARITY UR: ABNORMAL
CO2 SERPL-SCNC: 29 MMOL/L (ref 21–32)
COLOR UR: ABNORMAL
CREAT SERPL-MCNC: <0.5 MG/DL (ref 0.6–1.1)
DEPRECATED RDW RBC AUTO: 14.2 % (ref 12.4–15.4)
DESCRIPTION BLOOD BANK: NORMAL
EOSINOPHIL # BLD: 0 K/UL (ref 0–0.6)
EOSINOPHIL NFR BLD: 0.2 %
EPI CELLS #/AREA URNS AUTO: 22 /HPF (ref 0–5)
ETHANOLAMINE SERPL-MCNC: NORMAL MG/DL (ref 0–0.08)
GFR SERPLBLD CREATININE-BSD FMLA CKD-EPI: >90 ML/MIN/{1.73_M2}
GLUCOSE SERPL-MCNC: 117 MG/DL (ref 70–99)
GLUCOSE UR STRIP.AUTO-MCNC: NEGATIVE MG/DL
HCG SERPL QL: NEGATIVE
HCT VFR BLD AUTO: 15.1 % (ref 36–48)
HCT VFR BLD AUTO: 16.9 % (ref 36–48)
HGB BLD-MCNC: 5.1 G/DL (ref 12–16)
HGB BLD-MCNC: 5.6 G/DL (ref 12–16)
HGB UR QL STRIP.AUTO: NEGATIVE
HYALINE CASTS #/AREA URNS AUTO: 19 /LPF (ref 0–8)
HYALINE CASTS: PRESENT
INR PPP: 1.08 (ref 0.85–1.15)
KETONES UR STRIP.AUTO-MCNC: NEGATIVE MG/DL
LACTATE BLDV-SCNC: 5.3 MMOL/L (ref 0.4–1.9)
LACTATE BLDV-SCNC: 6.3 MMOL/L (ref 0.4–2)
LACTATE BLDV-SCNC: 7.2 MMOL/L (ref 0.4–1.9)
LEUKOCYTE ESTERASE UR QL STRIP.AUTO: ABNORMAL
LIPASE SERPL-CCNC: 64 U/L (ref 13–60)
LYMPHOCYTES # BLD: 2.1 K/UL (ref 1–5.1)
LYMPHOCYTES NFR BLD: 11.4 %
MAGNESIUM SERPL-MCNC: 1.7 MG/DL (ref 1.8–2.4)
MCH RBC QN AUTO: 31.9 PG (ref 26–34)
MCHC RBC AUTO-ENTMCNC: 33.9 G/DL (ref 31–36)
MCV RBC AUTO: 94.4 FL (ref 80–100)
MONOCYTES # BLD: 0.4 K/UL (ref 0–1.3)
MONOCYTES NFR BLD: 2.4 %
NEUTROPHILS # BLD: 15.5 K/UL (ref 1.7–7.7)
NEUTROPHILS NFR BLD: 85.8 %
NITRITE UR QL STRIP.AUTO: POSITIVE
PH UR STRIP.AUTO: 5.5 [PH] (ref 5–8)
PLATELET # BLD AUTO: 202 K/UL (ref 135–450)
PMV BLD AUTO: 8.1 FL (ref 5–10.5)
POTASSIUM SERPL-SCNC: 2.4 MMOL/L (ref 3.5–5.1)
POTASSIUM SERPL-SCNC: 2.6 MMOL/L (ref 3.5–5.1)
PROT SERPL-MCNC: 5.9 G/DL (ref 6.4–8.2)
PROT UR STRIP.AUTO-MCNC: 30 MG/DL
PROTHROMBIN TIME: 14.3 SEC (ref 11.9–14.9)
RBC # BLD AUTO: 1.6 M/UL (ref 4–5.2)
RBC #/AREA URNS HPF: ABNORMAL /HPF (ref 0–4)
SODIUM SERPL-SCNC: 134 MMOL/L (ref 136–145)
SP GR UR STRIP.AUTO: 1.02 (ref 1–1.03)
TROPONIN, HIGH SENSITIVITY: 12 NG/L (ref 0–14)
TROPONIN, HIGH SENSITIVITY: 15 NG/L (ref 0–14)
UA COMPLETE W REFLEX CULTURE PNL UR: YES
UA DIPSTICK W REFLEX MICRO PNL UR: YES
URN SPEC COLLECT METH UR: ABNORMAL
UROBILINOGEN UR STRIP-ACNC: 2 E.U./DL
WBC # BLD AUTO: 18.1 K/UL (ref 4–11)
WBC #/AREA URNS AUTO: 733 /HPF (ref 0–5)

## 2024-05-03 PROCEDURE — 6370000000 HC RX 637 (ALT 250 FOR IP): Performed by: STUDENT IN AN ORGANIZED HEALTH CARE EDUCATION/TRAINING PROGRAM

## 2024-05-03 PROCEDURE — 6370000000 HC RX 637 (ALT 250 FOR IP): Performed by: INTERNAL MEDICINE

## 2024-05-03 PROCEDURE — 84484 ASSAY OF TROPONIN QUANT: CPT

## 2024-05-03 PROCEDURE — 36430 TRANSFUSION BLD/BLD COMPNT: CPT

## 2024-05-03 PROCEDURE — 74177 CT ABD & PELVIS W/CONTRAST: CPT

## 2024-05-03 PROCEDURE — 86923 COMPATIBILITY TEST ELECTRIC: CPT

## 2024-05-03 PROCEDURE — 84443 ASSAY THYROID STIM HORMONE: CPT

## 2024-05-03 PROCEDURE — 86901 BLOOD TYPING SEROLOGIC RH(D): CPT

## 2024-05-03 PROCEDURE — 36415 COLL VENOUS BLD VENIPUNCTURE: CPT

## 2024-05-03 PROCEDURE — 82077 ASSAY SPEC XCP UR&BREATH IA: CPT

## 2024-05-03 PROCEDURE — 85014 HEMATOCRIT: CPT

## 2024-05-03 PROCEDURE — 6360000002 HC RX W HCPCS: Performed by: INTERNAL MEDICINE

## 2024-05-03 PROCEDURE — 72148 MRI LUMBAR SPINE W/O DYE: CPT

## 2024-05-03 PROCEDURE — 6360000004 HC RX CONTRAST MEDICATION: Performed by: STUDENT IN AN ORGANIZED HEALTH CARE EDUCATION/TRAINING PROGRAM

## 2024-05-03 PROCEDURE — 80048 BASIC METABOLIC PNL TOTAL CA: CPT

## 2024-05-03 PROCEDURE — 2580000003 HC RX 258: Performed by: STUDENT IN AN ORGANIZED HEALTH CARE EDUCATION/TRAINING PROGRAM

## 2024-05-03 PROCEDURE — 87077 CULTURE AEROBIC IDENTIFY: CPT

## 2024-05-03 PROCEDURE — 84703 CHORIONIC GONADOTROPIN ASSAY: CPT

## 2024-05-03 PROCEDURE — 87040 BLOOD CULTURE FOR BACTERIA: CPT

## 2024-05-03 PROCEDURE — 83605 ASSAY OF LACTIC ACID: CPT

## 2024-05-03 PROCEDURE — 86900 BLOOD TYPING SEROLOGIC ABO: CPT

## 2024-05-03 PROCEDURE — 2500000003 HC RX 250 WO HCPCS: Performed by: INTERNAL MEDICINE

## 2024-05-03 PROCEDURE — 2060000000 HC ICU INTERMEDIATE R&B

## 2024-05-03 PROCEDURE — 85018 HEMOGLOBIN: CPT

## 2024-05-03 PROCEDURE — 87086 URINE CULTURE/COLONY COUNT: CPT

## 2024-05-03 PROCEDURE — 84439 ASSAY OF FREE THYROXINE: CPT

## 2024-05-03 PROCEDURE — 81001 URINALYSIS AUTO W/SCOPE: CPT

## 2024-05-03 PROCEDURE — 84132 ASSAY OF SERUM POTASSIUM: CPT

## 2024-05-03 PROCEDURE — 80076 HEPATIC FUNCTION PANEL: CPT

## 2024-05-03 PROCEDURE — 83735 ASSAY OF MAGNESIUM: CPT

## 2024-05-03 PROCEDURE — 6360000002 HC RX W HCPCS: Performed by: STUDENT IN AN ORGANIZED HEALTH CARE EDUCATION/TRAINING PROGRAM

## 2024-05-03 PROCEDURE — P9016 RBC LEUKOCYTES REDUCED: HCPCS

## 2024-05-03 PROCEDURE — 86850 RBC ANTIBODY SCREEN: CPT

## 2024-05-03 PROCEDURE — 83690 ASSAY OF LIPASE: CPT

## 2024-05-03 PROCEDURE — 99285 EMERGENCY DEPT VISIT HI MDM: CPT

## 2024-05-03 PROCEDURE — 2580000003 HC RX 258: Performed by: INTERNAL MEDICINE

## 2024-05-03 PROCEDURE — 87186 SC STD MICRODIL/AGAR DIL: CPT

## 2024-05-03 PROCEDURE — 85610 PROTHROMBIN TIME: CPT

## 2024-05-03 PROCEDURE — 85025 COMPLETE CBC W/AUTO DIFF WBC: CPT

## 2024-05-03 RX ORDER — MAGNESIUM SULFATE IN WATER 40 MG/ML
2000 INJECTION, SOLUTION INTRAVENOUS ONCE
Status: COMPLETED | OUTPATIENT
Start: 2024-05-03 | End: 2024-05-04

## 2024-05-03 RX ORDER — IBUPROFEN 200 MG
400 TABLET ORAL EVERY 6 HOURS PRN
Status: ON HOLD | COMMUNITY
End: 2024-05-09 | Stop reason: HOSPADM

## 2024-05-03 RX ORDER — ACETAMINOPHEN 325 MG/1
650 TABLET ORAL EVERY 6 HOURS PRN
Status: DISCONTINUED | OUTPATIENT
Start: 2024-05-03 | End: 2024-05-09 | Stop reason: HOSPADM

## 2024-05-03 RX ORDER — SODIUM CHLORIDE 9 MG/ML
INJECTION, SOLUTION INTRAVENOUS CONTINUOUS
Status: ACTIVE | OUTPATIENT
Start: 2024-05-03 | End: 2024-05-05

## 2024-05-03 RX ORDER — ONDANSETRON 2 MG/ML
4 INJECTION INTRAMUSCULAR; INTRAVENOUS EVERY 6 HOURS PRN
Status: DISCONTINUED | OUTPATIENT
Start: 2024-05-03 | End: 2024-05-09 | Stop reason: HOSPADM

## 2024-05-03 RX ORDER — POTASSIUM CHLORIDE 7.45 MG/ML
10 INJECTION INTRAVENOUS PRN
Status: DISCONTINUED | OUTPATIENT
Start: 2024-05-03 | End: 2024-05-09 | Stop reason: HOSPADM

## 2024-05-03 RX ORDER — OXYCODONE HYDROCHLORIDE AND ACETAMINOPHEN 5; 325 MG/1; MG/1
2 TABLET ORAL
Status: DISCONTINUED | OUTPATIENT
Start: 2024-05-03 | End: 2024-05-03

## 2024-05-03 RX ORDER — SODIUM CHLORIDE 0.9 % (FLUSH) 0.9 %
5-40 SYRINGE (ML) INJECTION PRN
Status: DISCONTINUED | OUTPATIENT
Start: 2024-05-03 | End: 2024-05-09 | Stop reason: HOSPADM

## 2024-05-03 RX ORDER — SODIUM CHLORIDE 9 MG/ML
INJECTION, SOLUTION INTRAVENOUS PRN
Status: DISCONTINUED | OUTPATIENT
Start: 2024-05-03 | End: 2024-05-03

## 2024-05-03 RX ORDER — POTASSIUM CHLORIDE 20 MEQ/1
20 TABLET, EXTENDED RELEASE ORAL ONCE
Status: COMPLETED | OUTPATIENT
Start: 2024-05-03 | End: 2024-05-03

## 2024-05-03 RX ORDER — OXYCODONE HYDROCHLORIDE 5 MG/1
5 TABLET ORAL ONCE
Status: COMPLETED | OUTPATIENT
Start: 2024-05-03 | End: 2024-05-03

## 2024-05-03 RX ORDER — COVID-19 ANTIGEN TEST
1 KIT MISCELLANEOUS 2 TIMES DAILY PRN
Status: ON HOLD | COMMUNITY
End: 2024-05-09 | Stop reason: HOSPADM

## 2024-05-03 RX ORDER — HYDRALAZINE HYDROCHLORIDE 20 MG/ML
10 INJECTION INTRAMUSCULAR; INTRAVENOUS EVERY 6 HOURS PRN
Status: DISCONTINUED | OUTPATIENT
Start: 2024-05-03 | End: 2024-05-09 | Stop reason: HOSPADM

## 2024-05-03 RX ORDER — POTASSIUM CHLORIDE 7.45 MG/ML
10 INJECTION INTRAVENOUS
Status: DISPENSED | OUTPATIENT
Start: 2024-05-03 | End: 2024-05-03

## 2024-05-03 RX ORDER — ACETAMINOPHEN 650 MG/1
650 SUPPOSITORY RECTAL EVERY 6 HOURS PRN
Status: DISCONTINUED | OUTPATIENT
Start: 2024-05-03 | End: 2024-05-09 | Stop reason: HOSPADM

## 2024-05-03 RX ORDER — MAGNESIUM SULFATE IN WATER 40 MG/ML
2000 INJECTION, SOLUTION INTRAVENOUS PRN
Status: DISCONTINUED | OUTPATIENT
Start: 2024-05-03 | End: 2024-05-09 | Stop reason: HOSPADM

## 2024-05-03 RX ORDER — ONDANSETRON 4 MG/1
4 TABLET, ORALLY DISINTEGRATING ORAL EVERY 8 HOURS PRN
Status: DISCONTINUED | OUTPATIENT
Start: 2024-05-03 | End: 2024-05-09 | Stop reason: HOSPADM

## 2024-05-03 RX ORDER — ACETAMINOPHEN 325 MG/1
650 TABLET ORAL EVERY 6 HOURS PRN
COMMUNITY

## 2024-05-03 RX ORDER — SODIUM CHLORIDE 0.9 % (FLUSH) 0.9 %
5-40 SYRINGE (ML) INJECTION EVERY 12 HOURS SCHEDULED
Status: DISCONTINUED | OUTPATIENT
Start: 2024-05-03 | End: 2024-05-09 | Stop reason: HOSPADM

## 2024-05-03 RX ORDER — 0.9 % SODIUM CHLORIDE 0.9 %
500 INTRAVENOUS SOLUTION INTRAVENOUS PRN
Status: DISCONTINUED | OUTPATIENT
Start: 2024-05-03 | End: 2024-05-09 | Stop reason: HOSPADM

## 2024-05-03 RX ORDER — ENOXAPARIN SODIUM 100 MG/ML
40 INJECTION SUBCUTANEOUS NIGHTLY
Status: DISCONTINUED | OUTPATIENT
Start: 2024-05-03 | End: 2024-05-09 | Stop reason: HOSPADM

## 2024-05-03 RX ORDER — SODIUM CHLORIDE 9 MG/ML
INJECTION, SOLUTION INTRAVENOUS PRN
Status: DISCONTINUED | OUTPATIENT
Start: 2024-05-03 | End: 2024-05-09 | Stop reason: HOSPADM

## 2024-05-03 RX ORDER — POTASSIUM CHLORIDE 20 MEQ/1
40 TABLET, EXTENDED RELEASE ORAL PRN
Status: DISCONTINUED | OUTPATIENT
Start: 2024-05-03 | End: 2024-05-09 | Stop reason: HOSPADM

## 2024-05-03 RX ORDER — 0.9 % SODIUM CHLORIDE 0.9 %
30 INTRAVENOUS SOLUTION INTRAVENOUS ONCE
Status: COMPLETED | OUTPATIENT
Start: 2024-05-03 | End: 2024-05-03

## 2024-05-03 RX ORDER — GAUZE BANDAGE 2" X 2"
100 BANDAGE TOPICAL DAILY
Status: DISCONTINUED | OUTPATIENT
Start: 2024-05-04 | End: 2024-05-09 | Stop reason: HOSPADM

## 2024-05-03 RX ADMIN — IOPAMIDOL 75 ML: 755 INJECTION, SOLUTION INTRAVENOUS at 14:53

## 2024-05-03 RX ADMIN — THIAMINE HYDROCHLORIDE: 100 INJECTION, SOLUTION INTRAMUSCULAR; INTRAVENOUS at 21:49

## 2024-05-03 RX ADMIN — POTASSIUM CHLORIDE 10 MEQ: 7.46 INJECTION, SOLUTION INTRAVENOUS at 16:33

## 2024-05-03 RX ADMIN — SODIUM CHLORIDE: 9 INJECTION, SOLUTION INTRAVENOUS at 19:14

## 2024-05-03 RX ADMIN — ENOXAPARIN SODIUM 40 MG: 100 INJECTION SUBCUTANEOUS at 20:41

## 2024-05-03 RX ADMIN — POTASSIUM CHLORIDE 10 MEQ: 7.46 INJECTION, SOLUTION INTRAVENOUS at 18:08

## 2024-05-03 RX ADMIN — POTASSIUM CHLORIDE 10 MEQ: 7.46 INJECTION, SOLUTION INTRAVENOUS at 20:32

## 2024-05-03 RX ADMIN — SODIUM CHLORIDE 1710 ML: 9 INJECTION, SOLUTION INTRAVENOUS at 15:12

## 2024-05-03 RX ADMIN — WATER 1000 MG: 1 INJECTION INTRAMUSCULAR; INTRAVENOUS; SUBCUTANEOUS at 15:27

## 2024-05-03 RX ADMIN — POTASSIUM CHLORIDE 10 MEQ: 7.46 INJECTION, SOLUTION INTRAVENOUS at 15:14

## 2024-05-03 RX ADMIN — SODIUM CHLORIDE, PRESERVATIVE FREE 10 ML: 5 INJECTION INTRAVENOUS at 21:14

## 2024-05-03 RX ADMIN — ACETAMINOPHEN 650 MG: 325 TABLET ORAL at 20:40

## 2024-05-03 RX ADMIN — POTASSIUM CHLORIDE 20 MEQ: 1500 TABLET, EXTENDED RELEASE ORAL at 15:10

## 2024-05-03 RX ADMIN — OXYCODONE HYDROCHLORIDE 5 MG: 5 TABLET ORAL at 13:39

## 2024-05-03 ASSESSMENT — PAIN DESCRIPTION - LOCATION
LOCATION: BACK;LEG
LOCATION: BACK;LEG

## 2024-05-03 ASSESSMENT — PAIN SCALES - GENERAL
PAINLEVEL_OUTOF10: 6
PAINLEVEL_OUTOF10: 7
PAINLEVEL_OUTOF10: 8

## 2024-05-03 ASSESSMENT — LIFESTYLE VARIABLES
HOW OFTEN DO YOU HAVE A DRINK CONTAINING ALCOHOL: NEVER
HOW MANY STANDARD DRINKS CONTAINING ALCOHOL DO YOU HAVE ON A TYPICAL DAY: PATIENT DOES NOT DRINK

## 2024-05-03 ASSESSMENT — PAIN - FUNCTIONAL ASSESSMENT: PAIN_FUNCTIONAL_ASSESSMENT: 0-10

## 2024-05-03 ASSESSMENT — PAIN DESCRIPTION - ORIENTATION: ORIENTATION: RIGHT

## 2024-05-03 ASSESSMENT — PAIN DESCRIPTION - DESCRIPTORS: DESCRIPTORS: SHARP

## 2024-05-03 NOTE — ED NOTES
How does patient ambulate?   [x]Low Fall Risk (ambulates by themselves without support)  []Stand by assist   []Contact Guard   []Front wheel walker  []Wheelchair   []Steady  []Bed bound  []History of Lower Extremity Amputation  []Unknown, did not assess in the emergency department   How does patient take pills?  [x]Whole with Water  []Crushed in applesauce  []Crushed in pudding  []Other  []Unknown no oral medications were given in the ED  Is patient alert?   [x]Alert  []Drowsy but responds to voice  []Doesn't respond to voice but responds to painful stimuli  []Unresponsive  Is patient oriented?   [x]To person  [x]To place  [x]To time  [x]To situation  []Confused  []Agitated  []Follows commands  If patient is disoriented or from a Skill Nursing Facility has family been notified of admission?   []Yes   []No  Patient belongings?   [x]Cell phone  [x]Wallet   []Dentures  [x]Clothing  Any specific patient or family belongings/needs/dynamics?   na  Miscellaneous comments/pending orders?  na    If there are any additional questions please reach out to the Emergency Department.

## 2024-05-03 NOTE — PROGRESS NOTES
Pharmacy Home Medication Reconciliation Note    A medication reconciliation has been completed for Eneida Berman 1975    Pharmacy: [address]  Information provided by: patient    The patient's home medication list is as follows:  No current facility-administered medications on file prior to encounter.     Current Outpatient Medications on File Prior to Encounter   Medication Sig Dispense Refill    acetaminophen (TYLENOL) 325 MG tablet Take 2 tablets by mouth every 6 hours as needed for Pain      ibuprofen (ADVIL;MOTRIN) 200 MG tablet Take 2 tablets by mouth every 6 hours as needed for Pain      Naproxen Sodium (ALEVE) 220 MG CAPS Take 1 capsule by mouth 2 times daily as needed for Pain      alprazolam (XANAX) 2 MG tablet Take 2 mg by mouth daily.   (Patient not taking: Reported on 5/3/2024)      metoprolol (TOPROL-XL) 25 MG XL tablet Take 25 mg by mouth daily.   (Patient not taking: Reported on 5/3/2024)         Patient is no longer taking alprazolam or metoprolol succinate.      Timing of last doses updated.    Thank you,  Airam Morrison, BONGhT

## 2024-05-03 NOTE — ED PROVIDER NOTES
abnormal marrow signal.         CT ABDOMEN PELVIS W IV CONTRAST Additional Contrast? None    (Results Pending)         LABS:  Labs Reviewed   CBC WITH AUTO DIFFERENTIAL - Abnormal; Notable for the following components:       Result Value    WBC 18.1 (*)     RBC 1.60 (*)     Hemoglobin 5.1 (*)     Hematocrit 15.1 (*)     Neutrophils Absolute 15.5 (*)     All other components within normal limits    Narrative:     CALL  [facility] tel. [phone],  Hematology results called to and read back by MARY ELLEN Poole, 05/03/2024  14:23, by BIGCO   LIPASE - Abnormal; Notable for the following components:    Lipase 64.0 (*)     All other components within normal limits    Narrative:     CALL  [facility] tel. [phone],  Chemistry results called to and read back by MARY ELLEN Narayanan, 05/03/2024  14:00, by BENJAMIN   BASIC METABOLIC PANEL W/ REFLEX TO MG FOR LOW K - Abnormal; Notable for the following components:    Sodium 134 (*)     Potassium reflex Magnesium 2.4 (*)     Chloride 87 (*)     Anion Gap 18 (*)     Glucose 117 (*)     Creatinine <0.5 (*)     All other components within normal limits    Narrative:     CALL  [facility] tel. [phone],  Chemistry results called to and read back by MARY ELLEN Narayanan, 05/03/2024  14:00, by BENJAMIN   HEPATIC FUNCTION PANEL - Abnormal; Notable for the following components:    Total Protein 5.9 (*)     Albumin 2.9 (*)     Alkaline Phosphatase 685 (*)     AST 56 (*)     Total Bilirubin 3.3 (*)     Bilirubin, Direct 2.3 (*)     All other components within normal limits    Narrative:     CALL  [facility] tel. [phone],  Chemistry results called to and read back by MARY ELLEN Narayanan, 05/03/2024  14:00, by BENJAMIN   TROPONIN - Abnormal; Notable for the following components:    Troponin, High Sensitivity 15 (*)     All other components within normal limits    Narrative:     CALL  [facility] tel. [phone],  Chemistry results called to and read back by MARY ELLEN Narayanan, 05/03/2024  14:00, by BENJAMIN  20 97 %   05/03/24 1846 (!) 127/58 98.7 °F (37.1 °C) (!) 103 17 100 %   05/03/24 1945 111/75 98.8 °F (37.1 °C) (!) 101 -- 98 %      Recent Labs     05/03/24  1325   WBC 18.1*   CREATININE <0.5*   BILITOT 3.3*            Time Severe Sepsis Identified: 1535 when lactate resulted high    Fluid Resuscitation Rational: at least 30mL/kg based on ideal body weight due to obesity defined as BMI >30 (patient's BMI is Body mass index is 36.68 kg/m². and IBW is Ideal body weight: 57 kg (125 lb 10.6 oz)Adjusted ideal body weight: 74.2 kg (163 lb 8.9 oz))      Repeat lactate level: ordered and pending at this time    Reassessment Exam:   Not applicable. Patient does not have septic shock.        Course and MDM:  Patient presents for evaluation of right upper quadrant abdominal pain and back pain radiating down into the right lower extremity.  She also endorses concerning symptoms of groin numbness and weakness to the right leg intermittently over the last several weeks.  She has excellent strength and sensation in her lower extremities but I am still somewhat concerned for cauda equina we will plan for MRI lumbar spine without contrast to assess for central canal stenosis.  Currently have low suspicion for epidural abscess with lack of point tenderness to the lumbar spine.  She also has right upper quadrant tenderness and history of alcohol abuse.  She does appear jaundiced today.  Will proceed with CT abdomen pelvis.    Sepsis was of low suspicion on arrival however her urine is showing significant bacteriuria  as of 1412, and her white count is elevated at 18.  At this point I have ordered her full sepsis fluids per ideal body weight and antibiotics to treat urinary tract infection.  Lactate has been ordered with blood cultures.    Also has significant hypokalemia which we will replete slowly IV.  Hyperbilirubinemia is noted today with transaminitis likely in the setting of alcohol abuse.    1513-MRI lumbar spine

## 2024-05-03 NOTE — H&P
study to cause pain  Plan: consult ortho spine to eval films          Diagnoses as listed above, designated as new or established and plan outlined for each.      Case discussed with: CAROLYN morel    MDM Determination    PROBLEM  High: life threatening unstable chronic illness (I.e. severe COPD, asthma) or acute illness (i.e. MI, PE, IMELDA, stroke,severe resp distress, acute change in neurologic status, suicidal ideation)  PLUS  high: iv narcotics or other iv meds which require monitoring (e.g. digoxin, amiodarone, vancomycin, coumadin, heparin, antiepileptics, chemotherapy,insulin drip, procrit) and high: at least 2 of: personally interpret study, discuss with external specialist, review/order 3+ tests - cbc bmp mri L spine, LFTs, lactate    OR TIME BASED  N/A - see problem based determination above       Total critical care time caring for this patient with life threatening illness, including direct patient contact, management of life support systems, review of data including imaging and labs, discussions with other team members and physicians at least 35 minutes today        The patient is being placed in inpatient status with the expectation of requiring a hospital stay spanning at least two midnights for care and treatment of the problems noted in the problem list.  This determination is also based on thepatients comorbidities and past medical history, the severity and timing of the signs and symptoms upon presentation.    (Please note that portions of this note were completed with a voice recognition program.  Efforts were made to edit the dictations but occasionally words are mis-transcribed.)      Electronically signed by: Luigi Mejía MD 5/3/2024    Please use [employer] to contact me with any questions during the day.   The hospitalist service will provide cross-coverage for this patient from 7pm to 7am.    During those hours, contact the on-call hospitalist MD/MIGUEL ANGEL for questions.

## 2024-05-03 NOTE — CONSENT
Informed Consent for Blood Component Transfusion Note    I have discussed with the patient the rationale for blood component transfusion; its benefits in treating or preventing fatigue, organ damage, or death; and its risk which includes mild transfusion reactions, rare risk of blood borne infection, or more serious but rare reactions. I have discussed the alternatives to transfusion, including the risk and consequences of not receiving transfusion. The patient had an opportunity to ask questions and had agreed to proceed with transfusion of blood components.    Electronically signed by Matias Giang MD on 5/3/24 at 3:34 PM EDT

## 2024-05-03 NOTE — PROGRESS NOTES
Pt admitted for uti, sepsis, back pain      Full h+p to follow    Active Hospital Problems    Diagnosis Date Noted    Alcohol abuse [F10.10] 05/03/2024    Anemia [D64.9] 05/03/2024    UTI (urinary tract infection) [N39.0] 05/03/2024    Sepsis (HCC) [A41.9] 05/03/2024    Back pain [M54.9] 05/03/2024    Hypokalemia [E87.6] 11/28/2018       Please use [employer] to contact me with any questions during the day.   The hospitalist service will provide cross-coverage for this patient from 7pm to 7am.    During those hours, contact the on-call hospitalist MD/MIGUEL ANGEL for questions.

## 2024-05-04 LAB
ALBUMIN SERPL-MCNC: 2.8 G/DL (ref 3.4–5)
ALP SERPL-CCNC: 783 U/L (ref 40–129)
ALT SERPL-CCNC: 19 U/L (ref 10–40)
ANION GAP SERPL CALCULATED.3IONS-SCNC: 11 MMOL/L (ref 3–16)
ANISOCYTOSIS BLD QL SMEAR: ABNORMAL
AST SERPL-CCNC: 71 U/L (ref 15–37)
BASOPHILS # BLD: 0 K/UL (ref 0–0.2)
BASOPHILS NFR BLD: 0 %
BILIRUB DIRECT SERPL-MCNC: 2.8 MG/DL (ref 0–0.3)
BILIRUB INDIRECT SERPL-MCNC: 0.9 MG/DL (ref 0–1)
BILIRUB SERPL-MCNC: 3.7 MG/DL (ref 0–1)
BLOOD BANK DISPENSE STATUS: NORMAL
BLOOD BANK DISPENSE STATUS: NORMAL
BLOOD BANK PRODUCT CODE: NORMAL
BLOOD BANK PRODUCT CODE: NORMAL
BPU ID: NORMAL
BPU ID: NORMAL
BUN SERPL-MCNC: 6 MG/DL (ref 7–20)
CALCIUM SERPL-MCNC: 7.6 MG/DL (ref 8.3–10.6)
CHLORIDE SERPL-SCNC: 93 MMOL/L (ref 99–110)
CO2 SERPL-SCNC: 29 MMOL/L (ref 21–32)
CREAT SERPL-MCNC: <0.5 MG/DL (ref 0.6–1.1)
DEPRECATED RDW RBC AUTO: 18 % (ref 12.4–15.4)
DESCRIPTION BLOOD BANK: NORMAL
DESCRIPTION BLOOD BANK: NORMAL
EOSINOPHIL # BLD: 0 K/UL (ref 0–0.6)
EOSINOPHIL NFR BLD: 0 %
FERRITIN SERPL IA-MCNC: 2343 NG/ML (ref 15–150)
FOLATE SERPL-MCNC: 5.03 NG/ML (ref 4.78–24.2)
GFR SERPLBLD CREATININE-BSD FMLA CKD-EPI: >90 ML/MIN/{1.73_M2}
GGT SERPL-CCNC: 1971 U/L (ref 5–36)
GLUCOSE SERPL-MCNC: 94 MG/DL (ref 70–99)
HAPTOGLOB SERPL-MCNC: 358 MG/DL (ref 30–200)
HCT VFR BLD AUTO: 21.8 % (ref 36–48)
HGB BLD-MCNC: 7.7 G/DL (ref 12–16)
IRON SATN MFR SERPL: 87 % (ref 15–50)
IRON SERPL-MCNC: 137 UG/DL (ref 37–145)
LACTATE BLDV-SCNC: 2.2 MMOL/L (ref 0.4–2)
LYMPHOCYTES # BLD: 1.9 K/UL (ref 1–5.1)
LYMPHOCYTES NFR BLD: 10 %
MAGNESIUM SERPL-MCNC: 2.2 MG/DL (ref 1.8–2.4)
MCH RBC QN AUTO: 31.2 PG (ref 26–34)
MCHC RBC AUTO-ENTMCNC: 35.1 G/DL (ref 31–36)
MCV RBC AUTO: 88.9 FL (ref 80–100)
METAMYELOCYTES NFR BLD MANUAL: 1 %
MONOCYTES # BLD: 0.5 K/UL (ref 0–1.3)
MONOCYTES NFR BLD: 3 %
NEUTROPHILS # BLD: 15.2 K/UL (ref 1.7–7.7)
NEUTROPHILS NFR BLD: 68 %
NEUTS BAND NFR BLD MANUAL: 17 % (ref 0–7)
NEUTS VAC BLD QL SMEAR: PRESENT
PLATELET # BLD AUTO: 184 K/UL (ref 135–450)
PLATELET BLD QL SMEAR: ADEQUATE
PMV BLD AUTO: 7.8 FL (ref 5–10.5)
POLYCHROMASIA BLD QL SMEAR: ABNORMAL
POTASSIUM SERPL-SCNC: 3.2 MMOL/L (ref 3.5–5.1)
POTASSIUM SERPL-SCNC: 3.2 MMOL/L (ref 3.5–5.1)
PROCALCITONIN SERPL IA-MCNC: 0.72 NG/ML (ref 0–0.15)
PROT SERPL-MCNC: 5.8 G/DL (ref 6.4–8.2)
RBC # BLD AUTO: 2.45 M/UL (ref 4–5.2)
SLIDE REVIEW: ABNORMAL
SODIUM SERPL-SCNC: 133 MMOL/L (ref 136–145)
T4 FREE SERPL-MCNC: 0.9 NG/DL (ref 0.9–1.8)
T4 FREE SERPL-MCNC: 1.1 NG/DL (ref 0.9–1.8)
TARGETS BLD QL SMEAR: ABNORMAL
TIBC SERPL-MCNC: 158 UG/DL (ref 260–445)
TSH SERPL DL<=0.005 MIU/L-ACNC: 5.05 UIU/ML (ref 0.27–4.2)
TSH SERPL DL<=0.005 MIU/L-ACNC: 6.95 UIU/ML (ref 0.27–4.2)
VARIANT LYMPHS NFR BLD MANUAL: 1 % (ref 0–6)
VIT B12 SERPL-MCNC: 618 PG/ML (ref 211–911)
WBC # BLD AUTO: 17.7 K/UL (ref 4–11)

## 2024-05-04 PROCEDURE — 85025 COMPLETE CBC W/AUTO DIFF WBC: CPT

## 2024-05-04 PROCEDURE — 36415 COLL VENOUS BLD VENIPUNCTURE: CPT

## 2024-05-04 PROCEDURE — 82728 ASSAY OF FERRITIN: CPT

## 2024-05-04 PROCEDURE — 84132 ASSAY OF SERUM POTASSIUM: CPT

## 2024-05-04 PROCEDURE — 82746 ASSAY OF FOLIC ACID SERUM: CPT

## 2024-05-04 PROCEDURE — 97162 PT EVAL MOD COMPLEX 30 MIN: CPT

## 2024-05-04 PROCEDURE — 80076 HEPATIC FUNCTION PANEL: CPT

## 2024-05-04 PROCEDURE — 97530 THERAPEUTIC ACTIVITIES: CPT

## 2024-05-04 PROCEDURE — 82977 ASSAY OF GGT: CPT

## 2024-05-04 PROCEDURE — 84145 PROCALCITONIN (PCT): CPT

## 2024-05-04 PROCEDURE — 84165 PROTEIN E-PHORESIS SERUM: CPT

## 2024-05-04 PROCEDURE — 6360000002 HC RX W HCPCS: Performed by: NURSE PRACTITIONER

## 2024-05-04 PROCEDURE — 97116 GAIT TRAINING THERAPY: CPT

## 2024-05-04 PROCEDURE — 84443 ASSAY THYROID STIM HORMONE: CPT

## 2024-05-04 PROCEDURE — 97535 SELF CARE MNGMENT TRAINING: CPT

## 2024-05-04 PROCEDURE — 82784 ASSAY IGA/IGD/IGG/IGM EACH: CPT

## 2024-05-04 PROCEDURE — 83540 ASSAY OF IRON: CPT

## 2024-05-04 PROCEDURE — 83735 ASSAY OF MAGNESIUM: CPT

## 2024-05-04 PROCEDURE — 84439 ASSAY OF FREE THYROXINE: CPT

## 2024-05-04 PROCEDURE — 83605 ASSAY OF LACTIC ACID: CPT

## 2024-05-04 PROCEDURE — 6370000000 HC RX 637 (ALT 250 FOR IP): Performed by: INTERNAL MEDICINE

## 2024-05-04 PROCEDURE — 6360000002 HC RX W HCPCS: Performed by: INTERNAL MEDICINE

## 2024-05-04 PROCEDURE — 83010 ASSAY OF HAPTOGLOBIN QUANT: CPT

## 2024-05-04 PROCEDURE — 82607 VITAMIN B-12: CPT

## 2024-05-04 PROCEDURE — 80048 BASIC METABOLIC PNL TOTAL CA: CPT

## 2024-05-04 PROCEDURE — 83516 IMMUNOASSAY NONANTIBODY: CPT

## 2024-05-04 PROCEDURE — 84155 ASSAY OF PROTEIN SERUM: CPT

## 2024-05-04 PROCEDURE — 97166 OT EVAL MOD COMPLEX 45 MIN: CPT

## 2024-05-04 PROCEDURE — 83521 IG LIGHT CHAINS FREE EACH: CPT

## 2024-05-04 PROCEDURE — 2060000000 HC ICU INTERMEDIATE R&B

## 2024-05-04 PROCEDURE — 36430 TRANSFUSION BLD/BLD COMPNT: CPT

## 2024-05-04 PROCEDURE — 2580000003 HC RX 258: Performed by: INTERNAL MEDICINE

## 2024-05-04 PROCEDURE — 83550 IRON BINDING TEST: CPT

## 2024-05-04 PROCEDURE — 6370000000 HC RX 637 (ALT 250 FOR IP): Performed by: NURSE PRACTITIONER

## 2024-05-04 RX ORDER — PANTOPRAZOLE SODIUM 40 MG/1
40 TABLET, DELAYED RELEASE ORAL
Status: DISCONTINUED | OUTPATIENT
Start: 2024-05-04 | End: 2024-05-09 | Stop reason: HOSPADM

## 2024-05-04 RX ORDER — MORPHINE SULFATE 2 MG/ML
1 INJECTION, SOLUTION INTRAMUSCULAR; INTRAVENOUS EVERY 4 HOURS PRN
Status: DISCONTINUED | OUTPATIENT
Start: 2024-05-04 | End: 2024-05-06

## 2024-05-04 RX ADMIN — WATER 1000 MG: 1 INJECTION INTRAMUSCULAR; INTRAVENOUS; SUBCUTANEOUS at 15:25

## 2024-05-04 RX ADMIN — POTASSIUM CHLORIDE 10 MEQ: 7.46 INJECTION, SOLUTION INTRAVENOUS at 05:23

## 2024-05-04 RX ADMIN — SODIUM CHLORIDE: 9 INJECTION, SOLUTION INTRAVENOUS at 04:26

## 2024-05-04 RX ADMIN — ACETAMINOPHEN 650 MG: 325 TABLET ORAL at 09:05

## 2024-05-04 RX ADMIN — POTASSIUM CHLORIDE 10 MEQ: 7.46 INJECTION, SOLUTION INTRAVENOUS at 02:06

## 2024-05-04 RX ADMIN — POTASSIUM CHLORIDE 40 MEQ: 1500 TABLET, EXTENDED RELEASE ORAL at 11:00

## 2024-05-04 RX ADMIN — SODIUM CHLORIDE, PRESERVATIVE FREE 10 ML: 5 INJECTION INTRAVENOUS at 00:08

## 2024-05-04 RX ADMIN — POTASSIUM CHLORIDE 10 MEQ: 7.46 INJECTION, SOLUTION INTRAVENOUS at 04:17

## 2024-05-04 RX ADMIN — PANTOPRAZOLE SODIUM 40 MG: 40 TABLET, DELAYED RELEASE ORAL at 11:00

## 2024-05-04 RX ADMIN — SODIUM CHLORIDE: 9 INJECTION, SOLUTION INTRAVENOUS at 17:37

## 2024-05-04 RX ADMIN — Medication 100 MG: at 08:55

## 2024-05-04 RX ADMIN — MORPHINE SULFATE 1 MG: 2 INJECTION, SOLUTION INTRAMUSCULAR; INTRAVENOUS at 14:05

## 2024-05-04 RX ADMIN — SODIUM CHLORIDE, PRESERVATIVE FREE 10 ML: 5 INJECTION INTRAVENOUS at 08:55

## 2024-05-04 RX ADMIN — MORPHINE SULFATE 1 MG: 2 INJECTION, SOLUTION INTRAMUSCULAR; INTRAVENOUS at 22:05

## 2024-05-04 RX ADMIN — SODIUM CHLORIDE, PRESERVATIVE FREE 10 ML: 5 INJECTION INTRAVENOUS at 04:20

## 2024-05-04 RX ADMIN — DICLOFENAC SODIUM 2 G: 10 GEL TOPICAL at 00:51

## 2024-05-04 RX ADMIN — DICLOFENAC SODIUM 2 G: 10 GEL TOPICAL at 08:55

## 2024-05-04 RX ADMIN — POTASSIUM CHLORIDE 10 MEQ: 7.46 INJECTION, SOLUTION INTRAVENOUS at 03:10

## 2024-05-04 RX ADMIN — ENOXAPARIN SODIUM 40 MG: 100 INJECTION SUBCUTANEOUS at 22:05

## 2024-05-04 RX ADMIN — MAGNESIUM SULFATE HEPTAHYDRATE 2000 MG: 40 INJECTION, SOLUTION INTRAVENOUS at 00:32

## 2024-05-04 RX ADMIN — POTASSIUM CHLORIDE 10 MEQ: 7.46 INJECTION, SOLUTION INTRAVENOUS at 00:48

## 2024-05-04 RX ADMIN — DICLOFENAC SODIUM 2 G: 10 GEL TOPICAL at 20:57

## 2024-05-04 RX ADMIN — POTASSIUM CHLORIDE 10 MEQ: 7.46 INJECTION, SOLUTION INTRAVENOUS at 06:30

## 2024-05-04 RX ADMIN — SODIUM CHLORIDE, PRESERVATIVE FREE 10 ML: 5 INJECTION INTRAVENOUS at 22:04

## 2024-05-04 ASSESSMENT — PAIN SCALES - GENERAL
PAINLEVEL_OUTOF10: 7
PAINLEVEL_OUTOF10: 5
PAINLEVEL_OUTOF10: 7
PAINLEVEL_OUTOF10: 0
PAINLEVEL_OUTOF10: 7
PAINLEVEL_OUTOF10: 5
PAINLEVEL_OUTOF10: 7

## 2024-05-04 ASSESSMENT — PAIN DESCRIPTION - FREQUENCY
FREQUENCY: INTERMITTENT
FREQUENCY: CONTINUOUS

## 2024-05-04 ASSESSMENT — PAIN DESCRIPTION - ORIENTATION
ORIENTATION: RIGHT
ORIENTATION: LEFT
ORIENTATION: RIGHT
ORIENTATION: RIGHT;LEFT
ORIENTATION: LEFT;RIGHT
ORIENTATION: RIGHT

## 2024-05-04 ASSESSMENT — PAIN - FUNCTIONAL ASSESSMENT
PAIN_FUNCTIONAL_ASSESSMENT: ACTIVITIES ARE NOT PREVENTED

## 2024-05-04 ASSESSMENT — PAIN DESCRIPTION - ONSET
ONSET: ON-GOING
ONSET: PROGRESSIVE

## 2024-05-04 ASSESSMENT — PAIN DESCRIPTION - DESCRIPTORS
DESCRIPTORS: BURNING;ACHING;SHARP
DESCRIPTORS: BURNING;THROBBING
DESCRIPTORS: SHARP;STABBING;NAGGING

## 2024-05-04 ASSESSMENT — PAIN DESCRIPTION - LOCATION
LOCATION: BACK;HIP;LEG
LOCATION: SACRUM
LOCATION: LEG
LOCATION: SACRUM
LOCATION: FOOT

## 2024-05-04 ASSESSMENT — PAIN DESCRIPTION - PAIN TYPE
TYPE: ACUTE PAIN
TYPE: ACUTE PAIN

## 2024-05-04 NOTE — CONSULTS
Gastroenterology Consult Note                          Patient: Eneida Berman  : 1975  CSN#:      Date:  2024    Subjective:       History of Present Illness  Patient is a 49 y.o. female admitted with Hypokalemia [E87.6]  Hyperbilirubinemia [E80.6]  Alcohol abuse [F10.10]  Sepsis secondary to UTI (HCC) [A41.9, N39.0]  Acute cystitis without hematuria [N30.00]  Anemia, unspecified type [D64.9] who is seen in consult for jaundice.  She has alcohol use disorder/abuse.  Drinks 2-3 bottles of pre-mixed margaritas per week.   Admitted with jaundice.  Denies ab pain or confusion.  Bili 3.7  .  AST 71 ALT 19.     Also hgb noted to be 5.1 but denies gross gi bleeding except for one small amount of red blood around normal stool 2 days ago.  Normocytic.   Noted to have UTI.   CT shows hepatomegaly with significant steatosis.   Denies new meds but is on NSAIDs.     Past Medical History:   Diagnosis Date    Anxiety     Migraine       Past Surgical History:   Procedure Laterality Date    ECTOPIC PREGNANCY SURGERY      right tube removed          Admission Meds  No current facility-administered medications on file prior to encounter.     Current Outpatient Medications on File Prior to Encounter   Medication Sig Dispense Refill    acetaminophen (TYLENOL) 325 MG tablet Take 2 tablets by mouth every 6 hours as needed for Pain      ibuprofen (ADVIL;MOTRIN) 200 MG tablet Take 2 tablets by mouth every 6 hours as needed for Pain      Naproxen Sodium (ALEVE) 220 MG CAPS Take 1 capsule by mouth 2 times daily as needed for Pain      alprazolam (XANAX) 2 MG tablet Take 2 mg by mouth daily.   (Patient not taking: Reported on 5/3/2024)      metoprolol (TOPROL-XL) 25 MG XL tablet Take 25 mg by mouth daily.   (Patient not taking: Reported on 5/3/2024)             Allergies  Allergies   Allergen Reactions    Latex Itching      Social   Social History     Tobacco Use    Smoking status: Never    Smokeless

## 2024-05-04 NOTE — FLOWSHEET NOTE
05/03/24 2215   Vital Signs   Temp 100 °F (37.8 °C)   Temp Source Oral   Pulse 99   Heart Rate Source Telemetry   Respirations 18   /63   MAP (Calculated) 79   BP Location Left upper arm   BP Method Automatic   Patient Position Semi fowlers   Oxygen Therapy   SpO2 95 %   Pulse Oximetry Type Intermittent   Pulse Oximeter Device Mode Intermittent   Pulse Oximeter Device Location Finger   O2 Device None (Room air)     Perfect serve sent to [facility] as follows:    5/3/24 10:34 PM   [id number].2215 1 hour post blood infusion febrile 100 oral, non-febrile prior to this. Tylenol for pain 2040. Otherwise asymptomatic of s/s of blood reaction.  Unread

## 2024-05-04 NOTE — FLOWSHEET NOTE
05/04/24 0250   Vitals   /65   Temp 99 °F (37.2 °C)   Temp Source Oral   Pulse 95   Respirations 16   SpO2 96 %     Blood, unit 2 of 2 ordered, infusing without difficulty, no s/s of reaction. 15 min vitals above. Blood now infusing at 200 ml/hr. Call light within reach, no d/d of distress/reaction noted, Pt tolerating all care well. Mag replacement complete, K replacement continues.

## 2024-05-04 NOTE — PROGRESS NOTES
2115  Blood infusion complete, repeat h/h lab ordered for 2215. Lab arrived to room for repeat lactic and pt/inr. No s/s of distress noted at this time, call light within reach, no pain or s/s of complications at IV site with potassium infusion. Pt tolerating all care well..

## 2024-05-04 NOTE — CONSULTS
[facility]    Consult Note      Requesting Physician:  Luigi Mejía MD    CHIEF COMPLAINT:  anemia      HISTORY OF PRESENT ILLNESS:    Ms. Berman  is a 49 y.o. female we are seeing in consultation for anemia and abnormal MRI, This is a patient that presented to the hospital with multiple complaints including pain starting in her lower back radiating down the right leg for a few weeks.  She also is having some intermittent abdominal pain.  A CT of the abdomen and pelvis was done that showed hepatomegaly with advanced steatosis.  There was also a small right kidney stone.  An MRI of her lumbar spine without contrast was done that shows that the marrow signal of the vertebral bodies are low which is a nonspecific finding.  As stated by the radiologist, sometimes anemia or marrow infiltrative processes can have this finding.  In light of this finding and her severe anemia with a hemoglobin of 5.1 on admission, hematology was consulted for further workup and management.  She denies any recent melena or hematochezia.  She is feeling better today after having a blood transfusion.  GI is contemplating an EGD and colon on Monday.    Past Medical History:  Past Medical History:   Diagnosis Date    Anxiety     Migraine        Past Surgical History:  Past Surgical History:   Procedure Laterality Date    ECTOPIC PREGNANCY SURGERY      right tube removed       Current Medications:  Current Facility-Administered Medications   Medication Dose Route Frequency Provider Last Rate Last Admin    diclofenac sodium (VOLTAREN) 1 % gel 2 g  2 g Topical BID Maria Antonia Hernandes APRN - CNP   2 g at 05/04/24 0855    pantoprazole (PROTONIX) tablet 40 mg  40 mg Oral QAM AC Tito Reddy MD   40 mg at 05/04/24 1100    morphine (PF) injection 1 mg  1 mg IntraVENous Q4H PRN Luigi Mejía MD        0.9 % sodium  HISTORY: groin numbness, RLE weakness assess for  cord compression  TECHNOLOGIST PROVIDED HISTORY:  Reason for exam:->groin numbness, RLE weakness assess for cord compression  Decision Support Exception - unselect if not a suspected or confirmed  emergency medical condition->Emergency Medical Condition (MA)    Initial evaluation    FINDINGS:  BONES/ALIGNMENT: The curvature of the lumbar spine is within normal limits.  The height of the lumbar vertebral bodies are maintained.  There is a  transitional vertebral body with an S1 disc. Correlation with plain films  prior to surgical intervention is suggested.  For the sake of this dictation,  the lowest well-formed vertebral body is being called S1.  There is minimal  spondylolisthesis of L5 on S1.    There are Modic type 2 degenerative endplate changes at L3-L4 and L4-L5.  There is disc space narrowing and osteophytes at L2-L3, L3-L4, L4-L5, and  L5-S1.    The marrow signal of the vertebral bodies are low on T1-weighted images.  This is a nonspecific finding.  This sometimes can be normal in a young  patient.  Sometimes anemia or marrow infiltrative processes can have this  finding.  A patient in an immunocompromised state can have this appearance to  the marrow. Clinically correlate.    There is question of high signal in the right sacral ala.  An insufficiency  fracture or other abnormality of the sacrum cannot be excluded.  This is only  partially visualized.  A dedicated MRI of the sacrum could better evaluate  for marrow signal abnormalities.    SPINAL CORD: The conus terminates normally. 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    SOFT TISSUES: No paraspinal mass identified.    L1-L2: There are minimal osteophytes. The thecal sac and neural foramina are  intact.    L2-L3: There are osteophytes lateral toward the left.  There are osteophytes  lateral toward the right.  There is minimal narrowing of the right neural  foramen.  The thecal sac

## 2024-05-04 NOTE — FLOWSHEET NOTE
05/04/24 0040   Vital Signs   Temp 99.2 °F (37.3 °C)   Temp Source Oral   Pulse 98   Heart Rate Source Telemetry   Respirations 15   /70   MAP (Calculated) 83   BP Location Left upper arm   BP Method Automatic   Patient Position Semi fowlers   Pain Assessment   Pain Assessment 0-10   Pain Level 7   Patient's Stated Pain Goal 0 - No pain   Pain Location Foot   Pain Orientation Left   Pain Descriptors Burning;Throbbing   Functional Pain Assessment Activities are not prevented   Pain Type Acute pain   Pain Frequency Intermittent   Pain Onset Progressive   Non-Pharmaceutical Pain Intervention(s) Emotional support;Elevation   Oxygen Therapy   SpO2 97 %   Pulse Oximetry Type Intermittent   Pulse Oximeter Device Mode Intermittent   Pulse Oximeter Device Location Finger   O2 Device None (Room air)     Blood, unit 1 of 2 ordered, reached vein at 0025 and infusing 15 min at 60 ml/hr at time of vitals, tolerating well, no s/s of reaction to blood noted. 0042 increased blood infusion rate to 175 ml/hr, no s/s of complications.   Magnesium replacement infusing. Potassium replacement, bag 1 of 6, infusing. Voltaren gel applied to feet, pain is most severe at L 4th and 5th toes. Resting in bed, spouse left a while ago. No s/s of distress noted, call light within reach.

## 2024-05-04 NOTE — PROGRESS NOTES
[facility] - Inpatient Rehabilitation Department   Phone: [phone]    Occupational Therapy    [] Initial Evaluation            [] Daily Treatment Note         [] Discharge Summary      Patient: Eneida Berman   : 1975   MRN: [id number]   Date of Service:  2024    Admitting Diagnosis:  Sepsis (HCC)  Current Admission Summary: 49 y.o. female who presents to the emergency department with multiple complaints primarily pain starting in the low back and radiating down the right leg into the mid right thigh ongoing for about 3 weeks.  She states that she feels like the right leg is weak and it was difficult to walk secondary to weakness and pain to the right leg.  She states that over the last 3 days she is now noticing numbness in her groin.  Denies trouble controlling bowels or bladder.  She denies any direct injury to her back or leg.  She endorses history of sciatica when she was pregnant but has not been worked up for this back pain in the recent years.  She does not think she is ever had any imaging of her back.  She also endorses abdominal pain to the right upper quadrant.  She is concerned about her liver.  She does have history of alcohol abuse.  Her mother is with her and states that her skin appears more jaundiced today.     MRI lumbar spine showing multiple findings including low marrow signal which could be seen in the setting of anemia. Questionable sacral fracture   Ortho spine asked to review films. Pt has been ambulating to/from bathroom during admission prior to eval, RN approval to see. Up as tolerated orders in chart      Pt noted to be anemia; transfusion ordered by ER, 2 units last night  Pt also with evidence of UTI and meets sepsis criteria    To be admitted for iv abx and fluids as well and pain    Past Medical History:  has a past medical history of Anxiety and Migraine.  Past Surgical History:  has a past surgical history that includes Ectopic pregnancy

## 2024-05-04 NOTE — FLOWSHEET NOTE
05/03/24 2301   Treatment Team Notification   Reason for Communication Critical results   Type of Critical Result Laboratory   Critical Lab Information H/H 5.6/16.9   Person Result Received From Lab/Kathy   Critical Lab Result Type Hemoglobin and hematocrit   Name of Team Member Notified Maria Antonia Mejia-Nemesio   Treatment Team Role Advanced Practice Nurse   Method of Communication Secure Message   Response Waiting for response   Notification Time 2301

## 2024-05-04 NOTE — PROGRESS NOTES
VSS. Spouse at bedside. NaCl, bag 4/4 of potassium, and remaining amount (approx 78 ml) infusing without difficulty. Pt up to BR, CGA, output of 425 ML cloudy, mathieu, malodorous urine. Tylenol for back pain given. Stat lactic acid ordered @ 2045. Pt is tolerating all care wee, calm and cooperative. No s/s of distress noted, call light within reach.

## 2024-05-04 NOTE — CONSULTS
Orthopaedic Surgery Consultation:    Eneida Berman  [id number]  1975      CC:   Chief Complaint   Patient presents with    Leg Pain     Rt leg pain x 1 week, radiates from rt lower back to mid calf, pt has tried different creams, but she broke it out. Pt said it's difficult to walk d/t the pain, pt also said she has noticed blood in her urine last night        HPI:   Eneida Berman is a 49 y.o. female complains of predominantly right buttock pain.  Pain is not present for 1 week.  Pain began atraumatically.  Patient denies numbness and tingling.  1 month ago she did have a fall onto her right side off the couch.  She had no pain after that.  Pain is worse when laying on the right buttock, no significant pain with walking.  Denies numbness or tingling down the leg.  Denies radicular pain down the leg.  Has separate pain on the dorsum of her foot.  Occasionally does get some right anterior thigh and right groin pain          Past Medical History:   Diagnosis Date    Anxiety     Migraine        Past Surgical History:   Procedure Laterality Date    ECTOPIC PREGNANCY SURGERY      right tube removed       Social History     Socioeconomic History    Marital status:      Spouse name: Not on file    Number of children: Not on file    Years of education: Not on file    Highest education level: Not on file   Occupational History    Not on file   Tobacco Use    Smoking status: Never    Smokeless tobacco: Never   Substance and Sexual Activity    Alcohol use: Not Currently     Comment: states once a week \"occassionally\"    Drug use: No    Sexual activity: Not on file   Other Topics Concern    Not on file   Social History Narrative    Not on file     Social Determinants of Health     Financial Resource Strain: Not on file   Food Insecurity: No Food Insecurity (5/3/2024)    Hunger Vital Sign     Worried About Running Out of Food in the Last Year: Never true     Ran Out of Food in the Last Year: Never true  sigmoid diverticulosis without acute   diverticulitis.         MRI LUMBAR SPINE WO CONTRAST   Preliminary Result   There is a transitional vertebral body with an S1 disc. Correlation with   plain films prior to surgical intervention is suggested.      The marrow signal of the vertebral bodies are low on T1-weighted images.   This is a nonspecific finding.  This sometimes can be normal in a young   patient.  Sometimes anemia or marrow infiltrative processes can have this   finding.  A patient in an immunocompromised state can have this appearance to   the marrow. Clinically correlate.      Grade 1 spondylolisthesis of L5 on S1.      Disc and osteophytes narrow the neural foramina throughout the lumbar region   as discussed above.  No stenosis of the thecal sac in the lumbar region.      Questionable signal abnormality in the right sacral ala.  This could   represent an insufficiency fracture or other marrow abnormality.  This is not   fully evaluated on this exam.  MRI of the sacrum could better evaluate for   abnormal marrow signal.                  Medical decision making:  Eneida Berman is a 49 y.o. female presenting with symptoms consistent with right sacroiliac pain.    I personally viewed her MRI demonstrating some T2 signal hyperintensity on either side of the SI joint on the right side.  This is in the region of her tenderness palpation as well as her pain.    We discussed various conservative treatment options for sacroiliac pain.  I recommend symptomatic management, NSAIDs, activity modification, ice.  I discussed with patient that likely the symptoms will dissipate over the next 6 to 8 weeks.    She can follow-up with me as needed.      Nhan Currie MD  Orthopaedic Spine Surgery  [facility]  O: [phone]  E: [email or web address]

## 2024-05-04 NOTE — PROGRESS NOTES
[facility] - Inpatient Rehabilitation Department   Phone: [phone]    Physical Therapy    [x] Initial Evaluation            [] Daily Treatment Note         [] Discharge Summary      Patient: Eneida Berman   : 1975   MRN: [id number]   Date of Service:  2024  Admitting Diagnosis: Sepsis (HCC)  Current Admission Summary: 49 y.o. female who presents to the emergency department with multiple complaints primarily pain starting in the low back and radiating down the right leg into the mid right thigh ongoing for about 3 weeks.  She states that she feels like the right leg is weak and it was difficult to walk secondary to weakness and pain to the right leg.  She states that over the last 3 days she is now noticing numbness in her groin.  Denies trouble controlling bowels or bladder.  She denies any direct injury to her back or leg.  She endorses history of sciatica when she was pregnant but has not been worked up for this back pain in the recent years.  She does not think she is ever had any imaging of her back.  She also endorses abdominal pain to the right upper quadrant.  She is concerned about her liver.  She does have history of alcohol abuse.  Her mother is with her and states that her skin appears more jaundiced today.     MRI lumbar spine showing multiple findings including low marrow signal which could be seen in the setting of anemia. Questionable sacral fracture   Ortho spine asked to review films     Pt noted to be anemia; transfusion ordered by ER  Pt also with evidence of UTI and meets sepsis criteria  To be admitted for iv abx and fluids as well    Prior to evaluation this morning, pt was ambulating to and from bathroom during her admission.    Past Medical History:  has a past medical history of Anxiety and Migraine.  Past Surgical History:  has a past surgical history that includes Ectopic pregnancy surgery.  Discharge Recommendations: Eneida Berman scored a 17/24 on the

## 2024-05-04 NOTE — PROGRESS NOTES
[facility] - Inpatient Rehabilitation Department   Phone: [phone]    Occupational Therapy    [] Initial Evaluation            [] Daily Treatment Note         [] Discharge Summary      Patient: Eneida Berman   : 1975   MRN: [id number]   Date of Service:  2024    Admitting Diagnosis:  Sepsis (HCC)  Current Admission Summary: 49 y.o. female who presents to the emergency department with multiple complaints primarily pain starting in the low back and radiating down the right leg into the mid right thigh ongoing for about 3 weeks.  She states that she feels like the right leg is weak and it was difficult to walk secondary to weakness and pain to the right leg.  She states that over the last 3 days she is now noticing numbness in her groin.  Denies trouble controlling bowels or bladder.  She denies any direct injury to her back or leg.  She endorses history of sciatica when she was pregnant but has not been worked up for this back pain in the recent years.  She does not think she is ever had any imaging of her back.  She also endorses abdominal pain to the right upper quadrant.  She is concerned about her liver.  She does have history of alcohol abuse.  Her mother is with her and states that her skin appears more jaundiced today.     MRI lumbar spine showing multiple findings including low marrow signal which could be seen in the setting of anemia. Questionable sacral fracture   Ortho spine asked to review films. Pt has been ambulating to/from bathroom during admission prior to eval, RN approval to see. No further ambulation done than what pt has been doing since admitted       Pt noted to be anemia; transfusion ordered by ER, 2 units last night  Pt also with evidence of UTI and meets sepsis criteria    To be admitted for iv abx and fluids as well and pain    Past Medical History:  has a past medical history of Anxiety and Migraine.  Past Surgical History:  has a past surgical history that  but is able to drive  Hand Dominance: [] Left                 [x] Right  Current Employment:  Got hired by [employer] about 1 week ago and is in training - has to stand for her job and is supposed to be full time  Hobbies:   Recent Falls: reports 1 fall off the couch but reports this is when she sleeps on the couch and rolled over.     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 Recently her right leg started to feel numb which scared her to walk. She felt like if she would fall if she moves. Reports in the last month, she had difficulty walking from vehicle to apartment d/t SOB     Examination   Vision:   Vision Gross Assessment: Impaired and Vision Corrective Device: wears glasses at all times  Hearing:   WFL    Perception:   WFL  Observation:   General Observation:  yellowing of skin, brusising on R bottocks, pt reports she had icy hot gel and used the heating pad at the same time  Posture:   fair  Sensation:   reports numbness and tingling in (B) LE (R groin)  Proprioception:    WFL  Tone:   Normotonic  Coordination Testing:   WFL    ROM:   (B) UE AROM WFL  Strength:   (B) UE strength grossly +4    Therapist Clinical Decision Making (Complexity): medium complexity  Clinical Presentation: evolving      Subjective  General: pt supine upon arrival, agreeable to OT eval. No pain at rest, had tylenol prior to arrival. Reporting 6/10 pain in R posterior hip while in bathroom. 7/10 pain once in recliner. Pt has been ambulating to/from bathroom during admission prior to eval. RN notified of pain and OT/PT recommendation for further imaging. Pain impairing ability to participate in therapy   Pain: 7/10.  Location: R posterior hip  Pain Interventions: pain medication in place prior to arrival, RN notified, and ice applied        Activities of Daily Living  Basic Activities of Daily Living  Grooming: setup assistance  Upper Extremity Bathing: setup assistance  Lower Extremity Bathing:

## 2024-05-04 NOTE — PROGRESS NOTES
0418 blood infusion,unit 2 of 2, complete. IV flushed and continuous NaCl restarted. No s/s of distress noted, call light within reach.

## 2024-05-04 NOTE — FLOWSHEET NOTE
05/04/24 0522   Output (mL)   Urine 800 mL   Urine Assessment   Urinary Status Voiding   Urinary Incontinence Absent   Urine Color Yola   Urine Appearance Cloudy   Urine Odor Malodorous   Unmeasured Output   Urine Occurrence 1   Stool Occurrence 0   Emesis Occurrence 0     Also had 425 ml o/p at start of shift

## 2024-05-04 NOTE — PROGRESS NOTES
Progress Note - Dr. Mejía - Internal Medicine  PCP: Doug Dalal MD [id number] [facility] [address] [phone]    Hospital Day: 1  Code Status: Full Code  Current Diet: Diet NPO Exceptions are: Sips of Water with Meds, Ice Chips        CC: follow up on medical issues    Subjective:   Eneiad Berman is a 49 y.o. female.    Pt seen and examined  Chart reviewed since last visit, labs and imaging below      Still with pain  Anemia better  K better      Review of Systems: (1 system for EPF, 2-9 for detailed, 10+ for comprehensive)  Constitutional: Negative for chills and fever.     HENT: Negative for dental problem, nosebleeds and rhinorrhea.      Eyes: Negative for photophobia and visual disturbance.     Respiratory: Negative for cough, chest tightness and shortness of breath.      Cardiovascular: Negative for chest pain and leg swelling.     Gastrointestinal: Negative for diarrhea, nausea and vomiting.     Endocrine: Negative for polydipsia and polyphagia.     Genitourinary: Negative for frequency, hematuria and urgency.     Musculoskeletal: positive for back pain and myalgias.     Skin: Negative for rash.     Allergic/Immunologic: Negative for food allergies.     Neurological: Negative for dizziness, seizures, syncope and facial asymmetry.  Positive for R leg pain, weakness  Hematological: Negative for adenopathy.     Psychiatric/Behavioral: Negative for dysphoric mood. The patient is not nervous/anxious.              I have reviewed the patient's medical and social history in detail and updated the computerized patient record.  To recap: She  has a past medical history of Anxiety and Migraine.. She  has a past surgical history that includes Ectopic pregnancy surgery.. She  reports that she has never smoked. She has never used smokeless tobacco. She reports that she does not currently use alcohol. She reports that she does not use drugs..        Active Hospital Problems    Diagnosis Date Noted     and L4-L5. There is disc space narrowing and osteophytes at L2-L3, L3-L4, L4-L5, and L5-S1. The marrow signal of the vertebral bodies are low on T1-weighted images. This is a nonspecific finding.  This sometimes can be normal in a young patient.  Sometimes anemia or marrow infiltrative processes can have this finding.  A patient in an immunocompromised state can have this appearance to the marrow. Clinically correlate. There is question of high signal in the right sacral ala.  An insufficiency fracture or other abnormality of the sacrum cannot be excluded.  This is only partially visualized.  A dedicated MRI of the sacrum could better evaluate for marrow signal abnormalities. SPINAL CORD: The conus terminates normally. 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 SOFT TISSUES: No paraspinal mass identified. L1-L2: There are minimal osteophytes. The thecal sac and neural foramina are intact. L2-L3: There are osteophytes lateral toward the left.  There are osteophytes lateral toward the right.  There is minimal narrowing of the right neural foramen.  The thecal sac and left neural foramen are intact. L3-L4: There is mild diffuse disc bulge.  There are small osteophytes. Disc and/or osteophytes result in mild narrowing of the neural foramina bilaterally.  The thecal sac is not stenotic. There is mild facet and ligamentum flavum hypertrophy. L4-L5: There is mild facet and ligamentum flavum hypertrophy.  The thecal sac is not stenotic.  Disc and/or osteophytes result in mild narrowing of the neural foramina bilaterally.   The left neural foramen is narrowed greater than right. L5-S1: There is moderate facet and ligamentum flavum hypertrophy.  The thecal sac and S1 nerve roots are intact.  The neural foramina are intact.     There is a transitional vertebral body with an S1 disc. Correlation with plain films prior to surgical intervention is suggested. The marrow signal of the vertebral bodies are low on

## 2024-05-04 NOTE — CARE COORDINATION
Case Management Assessment  Initial Evaluation    Date/Time of Evaluation: 5/4/2024 2:44 PM  Assessment Completed by: DIOMEDES Sánchez    If patient is discharged prior to next notation, then this note serves as note for discharge by case management.    Patient Name: Eneida Berman                   YOB: 1975  Diagnosis: Hypokalemia [E87.6]  Hyperbilirubinemia [E80.6]  Alcohol abuse [F10.10]  Sepsis secondary to UTI (HCC) [A41.9, N39.0]  Acute cystitis without hematuria [N30.00]  Anemia, unspecified type [D64.9]                   Date / Time: 5/3/2024 12:40 PM    Patient Admission Status: Inpatient   Readmission Risk (Low < 19, Mod (19-27), High > 27): Readmission Risk Score: 11.8    Current PCP: Doug Dalal MD  PCP verified by CM? Yes    Chart Reviewed: Yes      History Provided by: Patient  Patient Orientation: Alert and Oriented    Patient Cognition: Alert    Hospitalization in the last 30 days (Readmission):  No    If yes, Readmission Assessment in CM Navigator will be completed.    Advance Directives:      Code Status: Full Code   Patient's Primary Decision Maker is: Named in Scanned ACP Document      Discharge Planning:    Patient lives with: Spouse/Significant Other Type of Home: Apartment  Primary Care Giver: Self  Patient Support Systems include: Spouse/Significant Other   Current Financial resources: None  Current community resources: None  Current services prior to admission: None            Current DME:              Type of Home Care services:  None    ADLS  Prior functional level: Independent in ADLs/IADLs  Current functional level: Assistance with the following:, Mobility    PT AM-PAC: 17 /24  OT AM-PAC: 17 /24    Family can provide assistance at DC: Yes  Would you like Case Management to discuss the discharge plan with any other family members/significant others, and if so, who? Yes  Plans to Return to Present Housing: Yes  Other Identified Issues/Barriers to RETURNING to

## 2024-05-04 NOTE — FLOWSHEET NOTE
05/03/24 2223   Treatment Team Notification   Reason for Communication Critical results   Type of Critical Result Laboratory   Critical Lab Information Lactic Acid 6.3   Person Result Received From Lab   Critical Lab Result Type Lactic acid   Name of Team Member Notified Maria Antonia Valdez-Marker   Treatment Team Role Advanced Practice Nurse   Method of Communication Secure Message   Response Waiting for response   Notification Time 2215     Message read at 2222. This nurse is waiting for response/orders.     2237-Perfect serve message/reply from APRN, aware and no new orders at this time.

## 2024-05-04 NOTE — FLOWSHEET NOTE
05/03/24 2320   Treatment Team Notification   Reason for Communication Critical results   Type of Critical Result Laboratory   Critical Lab Information K 2.6   Person Result Received From Lab/Arvin   Critical Lab Result Type Electrolytes   Name of Team Member Notified Maria Antonia Hernandes   Treatment Team Role Advanced Practice Nurse   Method of Communication Secure Message   Response Waiting for response   Notification Time 2320

## 2024-05-05 LAB
ALBUMIN SERPL-MCNC: 2.9 G/DL (ref 3.4–5)
ALBUMIN/GLOB SERPL: 0.9 {RATIO} (ref 1.1–2.2)
ALP SERPL-CCNC: 817 U/L (ref 40–129)
ALT SERPL-CCNC: 20 U/L (ref 10–40)
ANION GAP SERPL CALCULATED.3IONS-SCNC: 9 MMOL/L (ref 3–16)
ANISOCYTOSIS BLD QL SMEAR: ABNORMAL
AST SERPL-CCNC: 81 U/L (ref 15–37)
BACTERIA UR CULT: ABNORMAL
BASOPHILS # BLD: 0 K/UL (ref 0–0.2)
BASOPHILS NFR BLD: 0 %
BILIRUB SERPL-MCNC: 3.2 MG/DL (ref 0–1)
BUN SERPL-MCNC: 6 MG/DL (ref 7–20)
CALCIUM SERPL-MCNC: 8.2 MG/DL (ref 8.3–10.6)
CHLORIDE SERPL-SCNC: 97 MMOL/L (ref 99–110)
CO2 SERPL-SCNC: 28 MMOL/L (ref 21–32)
CREAT SERPL-MCNC: <0.5 MG/DL (ref 0.6–1.1)
DEPRECATED RDW RBC AUTO: 18.6 % (ref 12.4–15.4)
DOHLE BOD BLD QL SMEAR: PRESENT
EOSINOPHIL # BLD: 0.2 K/UL (ref 0–0.6)
EOSINOPHIL NFR BLD: 1 %
GFR SERPLBLD CREATININE-BSD FMLA CKD-EPI: >90 ML/MIN/{1.73_M2}
GLUCOSE SERPL-MCNC: 102 MG/DL (ref 70–99)
HCT VFR BLD AUTO: 22.6 % (ref 36–48)
HGB BLD-MCNC: 7.8 G/DL (ref 12–16)
LYMPHOCYTES # BLD: 3.8 K/UL (ref 1–5.1)
LYMPHOCYTES NFR BLD: 19 %
MAGNESIUM SERPL-MCNC: 1.9 MG/DL (ref 1.8–2.4)
MCH RBC QN AUTO: 31.1 PG (ref 26–34)
MCHC RBC AUTO-ENTMCNC: 34.5 G/DL (ref 31–36)
MCV RBC AUTO: 90.2 FL (ref 80–100)
MONOCYTES # BLD: 0 K/UL (ref 0–1.3)
MONOCYTES NFR BLD: 0 %
NEUTROPHILS # BLD: 16 K/UL (ref 1.7–7.7)
NEUTROPHILS NFR BLD: 80 %
ORGANISM: ABNORMAL
PLATELET # BLD AUTO: 245 K/UL (ref 135–450)
PLATELET BLD QL SMEAR: ADEQUATE
PMV BLD AUTO: 8.1 FL (ref 5–10.5)
POLYCHROMASIA BLD QL SMEAR: ABNORMAL
POTASSIUM SERPL-SCNC: 3.7 MMOL/L (ref 3.5–5.1)
PROCALCITONIN SERPL IA-MCNC: 0.57 NG/ML (ref 0–0.15)
PROT SERPL-MCNC: 6.2 G/DL (ref 6.4–8.2)
RBC # BLD AUTO: 2.5 M/UL (ref 4–5.2)
SLIDE REVIEW: ABNORMAL
SODIUM SERPL-SCNC: 134 MMOL/L (ref 136–145)
STOMATOCYTES BLD QL SMEAR: ABNORMAL
TARGETS BLD QL SMEAR: ABNORMAL
TOXIC GRANULES BLD QL SMEAR: PRESENT
WBC # BLD AUTO: 20 K/UL (ref 4–11)

## 2024-05-05 PROCEDURE — 2060000000 HC ICU INTERMEDIATE R&B

## 2024-05-05 PROCEDURE — 6370000000 HC RX 637 (ALT 250 FOR IP): Performed by: INTERNAL MEDICINE

## 2024-05-05 PROCEDURE — 83735 ASSAY OF MAGNESIUM: CPT

## 2024-05-05 PROCEDURE — 6360000002 HC RX W HCPCS: Performed by: INTERNAL MEDICINE

## 2024-05-05 PROCEDURE — 84145 PROCALCITONIN (PCT): CPT

## 2024-05-05 PROCEDURE — 36415 COLL VENOUS BLD VENIPUNCTURE: CPT

## 2024-05-05 PROCEDURE — 80053 COMPREHEN METABOLIC PANEL: CPT

## 2024-05-05 PROCEDURE — 85025 COMPLETE CBC W/AUTO DIFF WBC: CPT

## 2024-05-05 PROCEDURE — 2580000003 HC RX 258: Performed by: INTERNAL MEDICINE

## 2024-05-05 RX ORDER — PEG-3350, SODIUM SULFATE, SODIUM CHLORIDE, POTASSIUM CHLORIDE, SODIUM ASCORBATE AND ASCORBIC ACID 7.5-2.691G
100 KIT ORAL ONCE
Status: COMPLETED | OUTPATIENT
Start: 2024-05-06 | End: 2024-05-06

## 2024-05-05 RX ORDER — PEG-3350, SODIUM SULFATE, SODIUM CHLORIDE, POTASSIUM CHLORIDE, SODIUM ASCORBATE AND ASCORBIC ACID 7.5-2.691G
100 KIT ORAL ONCE
Status: COMPLETED | OUTPATIENT
Start: 2024-05-05 | End: 2024-05-05

## 2024-05-05 RX ADMIN — WATER 1000 MG: 1 INJECTION INTRAMUSCULAR; INTRAVENOUS; SUBCUTANEOUS at 16:20

## 2024-05-05 RX ADMIN — MORPHINE SULFATE 1 MG: 2 INJECTION, SOLUTION INTRAMUSCULAR; INTRAVENOUS at 10:15

## 2024-05-05 RX ADMIN — SODIUM CHLORIDE, PRESERVATIVE FREE 10 ML: 5 INJECTION INTRAVENOUS at 21:05

## 2024-05-05 RX ADMIN — SODIUM CHLORIDE, PRESERVATIVE FREE 10 ML: 5 INJECTION INTRAVENOUS at 08:35

## 2024-05-05 RX ADMIN — MORPHINE SULFATE 1 MG: 2 INJECTION, SOLUTION INTRAMUSCULAR; INTRAVENOUS at 16:20

## 2024-05-05 RX ADMIN — DICLOFENAC SODIUM 2 G: 10 GEL TOPICAL at 21:06

## 2024-05-05 RX ADMIN — MORPHINE SULFATE 1 MG: 2 INJECTION, SOLUTION INTRAMUSCULAR; INTRAVENOUS at 05:57

## 2024-05-05 RX ADMIN — ENOXAPARIN SODIUM 40 MG: 100 INJECTION SUBCUTANEOUS at 21:05

## 2024-05-05 RX ADMIN — Medication 100 MG: at 08:35

## 2024-05-05 RX ADMIN — PEG-3350, SODIUM SULFATE, SODIUM CHLORIDE, POTASSIUM CHLORIDE, SODIUM ASCORBATE AND ASCORBIC ACID 100 G: KIT at 20:47

## 2024-05-05 RX ADMIN — SODIUM CHLORIDE, PRESERVATIVE FREE 10 ML: 5 INJECTION INTRAVENOUS at 05:59

## 2024-05-05 RX ADMIN — PANTOPRAZOLE SODIUM 40 MG: 40 TABLET, DELAYED RELEASE ORAL at 06:01

## 2024-05-05 RX ADMIN — SODIUM CHLORIDE: 9 INJECTION, SOLUTION INTRAVENOUS at 10:20

## 2024-05-05 RX ADMIN — DICLOFENAC SODIUM 2 G: 10 GEL TOPICAL at 08:35

## 2024-05-05 ASSESSMENT — PAIN SCALES - GENERAL
PAINLEVEL_OUTOF10: 6
PAINLEVEL_OUTOF10: 8
PAINLEVEL_OUTOF10: 8
PAINLEVEL_OUTOF10: 0
PAINLEVEL_OUTOF10: 5
PAINLEVEL_OUTOF10: 6
PAINLEVEL_OUTOF10: 8
PAINLEVEL_OUTOF10: 0
PAINLEVEL_OUTOF10: 7

## 2024-05-05 ASSESSMENT — PAIN - FUNCTIONAL ASSESSMENT
PAIN_FUNCTIONAL_ASSESSMENT: ACTIVITIES ARE NOT PREVENTED
PAIN_FUNCTIONAL_ASSESSMENT: ACTIVITIES ARE NOT PREVENTED

## 2024-05-05 ASSESSMENT — PAIN DESCRIPTION - LOCATION
LOCATION: LEG
LOCATION: BACK;HIP;LEG
LOCATION: BACK;LEG
LOCATION: LEG
LOCATION: LEG
LOCATION: BACK;HIP

## 2024-05-05 ASSESSMENT — PAIN DESCRIPTION - ORIENTATION
ORIENTATION: RIGHT
ORIENTATION: RIGHT
ORIENTATION: RIGHT;LEFT
ORIENTATION: RIGHT
ORIENTATION: RIGHT
ORIENTATION: LEFT;RIGHT

## 2024-05-05 ASSESSMENT — PAIN DESCRIPTION - DESCRIPTORS
DESCRIPTORS: ACHING;BURNING
DESCRIPTORS: ACHING;STABBING
DESCRIPTORS: SHARP

## 2024-05-05 ASSESSMENT — PAIN DESCRIPTION - FREQUENCY
FREQUENCY: CONTINUOUS
FREQUENCY: CONTINUOUS

## 2024-05-05 ASSESSMENT — PAIN DESCRIPTION - PAIN TYPE: TYPE: ACUTE PAIN

## 2024-05-05 ASSESSMENT — PAIN DESCRIPTION - ONSET: ONSET: ON-GOING

## 2024-05-05 NOTE — PLAN OF CARE
Problem: Discharge Planning  Goal: Discharge to home or other facility with appropriate resources  5/5/2024 1119 by Tre Vasquez RN  Outcome: Progressing     Problem: Pain  Goal: Verbalizes/displays adequate comfort level or baseline comfort level  5/5/2024 1119 by Tre Vasquez, RN  Outcome: Progressing     Problem: Safety - Adult  Goal: Free from fall injury  5/5/2024 1119 by Tre Vasquez, RN  Outcome: Progressing

## 2024-05-05 NOTE — PROGRESS NOTES
Gastroenterology Progress Note            Eneida Bemran is a 49 y.o. female patient.  1. Alcohol abuse    2. Hypokalemia    3. Hyperbilirubinemia    4. Anemia, unspecified type    5. Acute cystitis without hematuria        SUBJECTIVE:  no blood per rectum yesterday.  No ab pain.     Physical    VITALS:  /83   Pulse 97   Temp 98.8 °F (37.1 °C) (Oral)   Resp 16   Ht 1.651 m (5' 5\")   Wt 100 kg (220 lb 6.4 oz)   SpO2 92%   BMI 36.68 kg/m²   TEMPERATURE:  Current - Temp: 98.8 °F (37.1 °C); Max - Temp  Av °F (37.2 °C)  Min: 98.8 °F (37.1 °C)  Max: 99.4 °F (37.4 °C)    Abdomen soft, ND, NT, no HSM, Bowel sounds normal     Data      Recent Labs     24  13224  0631 24  0511   WBC 18.1*  --  17.7* 20.0*   HGB 5.1* 5.6* 7.7* 7.8*   HCT 15.1* 16.9* 21.8* 22.6*   MCV 94.4  --  88.9 90.2     --  184 245     Recent Labs     24  13224  0631 24  0858 24  0511   *  --  133*  --  134*   K 2.4*   < > 3.2* 3.2* 3.7   CL 87*  --  93*  --  97*   CO2 29  --  29  --  28   BUN 7  --  6*  --  6*   CREATININE <0.5*  --  <0.5*  --  <0.5*    < > = values in this interval not displayed.     Recent Labs     24  0631 24  0511   AST 56* 71* 81*   ALT 19 19 20   BILIDIR 2.3* 2.8*  --    BILITOT 3.3* 3.7* 3.2*   ALKPHOS 685* 783* 817*     Recent Labs     24  1325   LIPASE 64.0*       Assessment:        Jaundice-  likely from alcohol use but has significant elevation in ALP which may be from fatty infiltration of the liver.   No sign of liver failure.  No biliary dilatation on imaging.   Elevated ALP- this may be from fatty liver infiltration but also there is abnormal Marrow signal in the spine so some of the ALP may be bone related.     Ddx also includes infiltrative process in the liver.      Normocytic anemia- no gross bleeding except one small amount around normal stool 2 dasy ago.   but is

## 2024-05-05 NOTE — PROGRESS NOTES
ONCOLOGY HEMATOLOGY CARE PROGRESS NOTE      SUBJECTIVE:  Patient still having pain in her back that radiates down her right leg.  She denies any shortness of breath or chest pain.    ROS:     Constitutional:  No weight loss, No fever, No chills, No night sweats.  Energy level good.  Eyes:  No impairment or change in vision  ENT / Mouth:  No pain, abnormal ulceration, bleeding, nasal drip or change in voice or hearing  Cardiovascular:  No chest pain, palpitations, new edema, or calf discomfort  Respiratory:  No pain, hemoptysis, change to breathing  Breast:  No pain, discharge, change in appearance or texture  Gastrointestinal:  No pain, cramping, jaundice, change to eating and bowel habits  Urinary:  No pain, bleeding or change in continence  Genitalia: No pain, bleeding or discharge  Musculoskeletal:  No redness, pain, edema or weakness  Skin:  No pruritus, rash, change to nodules or lesions  Neurologic:  No discomfort, change in mental status, speech, sensory or motor activity  Psychiatric:  No change in concentration or change to affect or mood  Endocrine:  No hot flashes, increased thirst, or change to urine production  Hematologic: No petechiae, ecchymosis or bleeding  Lymphatic:  No lymphadenopathy or lymphedema  Allergy / Immunologic:  No eczema, hives, frequent or recurrent infections    OBJECTIVE        Physical    VITALS:  Patient Vitals for the past 24 hrs:   BP Temp Temp src Pulse Resp SpO2 Weight   05/05/24 0955 -- -- -- -- -- -- 102.5 kg (225 lb 14.4 oz)   05/05/24 0830 130/83 98.8 °F (37.1 °C) Oral 97 16 92 % --   05/05/24 0627 -- -- -- -- 14 -- --   05/05/24 0557 -- -- -- -- 15 -- --   05/05/24 0546 128/82 99.1 °F (37.3 °C) Oral (!) 102 16 97 % --   05/05/24 0015 134/77 99.2 °F (37.3 °C) Oral 100 16 99 % --   05/04/24 2235 -- -- -- -- 14 -- --   05/04/24 2204 -- -- -- -- 15 -- --   05/04/24 1930 136/87 99.4 °F (37.4 °C) Oral (!) 101 17 98 % --   05/04/24 1549

## 2024-05-05 NOTE — PROGRESS NOTES
Progress Note - Dr. Mejía - Internal Medicine  PCP: Doug Dalal MD [id number] [facility] [id number] [phone]    Hospital Day: 2  Code Status: Full Code  Current Diet: ADULT DIET; Clear Liquid  Diet NPO Exceptions are: Sips of Water with Meds, Ice Chips        CC: follow up on medical issues    Subjective:   Eenida Berman is a 49 y.o. female.    Pt seen and examined  Chart reviewed since last visit, labs and imaging below      Still with pain  E coli on ucx    Awaiting egd/colon, BMBx      Review of Systems: (1 system for EPF, 2-9 for detailed, 10+ for comprehensive)  Constitutional: Negative for chills and fever.     HENT: Negative for dental problem, nosebleeds and rhinorrhea.      Eyes: Negative for photophobia and visual disturbance.     Respiratory: Negative for cough, chest tightness and shortness of breath.      Cardiovascular: Negative for chest pain and leg swelling.     Gastrointestinal: Negative for diarrhea, nausea and vomiting.     Endocrine: Negative for polydipsia and polyphagia.     Genitourinary: Negative for frequency, hematuria and urgency.     Musculoskeletal: positive for back pain and myalgias.     Skin: Negative for rash.     Allergic/Immunologic: Negative for food allergies.     Neurological: Negative for dizziness, seizures, syncope and facial asymmetry.  Positive for R leg pain, weakness  Hematological: Negative for adenopathy.     Psychiatric/Behavioral: Negative for dysphoric mood. The patient is not nervous/anxious.              I have reviewed the patient's medical and social history in detail and updated the computerized patient record.  To recap: She  has a past medical history of Anxiety and Migraine.. She  has a past surgical history that includes Ectopic pregnancy surgery.. She  reports that she has never smoked. She has never used smokeless tobacco. She reports that she does not currently use alcohol. She reports that she does not use drugs..        Active  foramina throughout the lumbar region as discussed above.  No stenosis of the thecal sac in the lumbar region. Questionable signal abnormality in the right sacral ala.  This could represent an insufficiency fracture or other marrow abnormality.  This is not fully evaluated on this exam.  MRI of the sacrum could better evaluate for abnormal marrow signal.       Lab Results   Component Value Date/Time    GLUCOSE 102 05/05/2024 05:11 AM     No results found for: \"POCGLU\"  /83   Pulse 97   Temp 98.8 °F (37.1 °C) (Oral)   Resp 16   Ht 1.651 m (5' 5\")   Wt 100 kg (220 lb 6.4 oz)   SpO2 92%   BMI 36.68 kg/m²     Assessment and Plan:  Principal Problem:    Sepsis (HCC) -Established problem. Stable.E Coli on cx  Plan: cont empiric abx.   Active Problems:    Hypokalemia -Established problem. Improving.  K 3.7  Plan: cont replacement as needed    Alcohol abuse -Established problem. Stable.  No signs of withdrawal  Plan: cont ciwa protocol, cont banana bag    Anemia -Established problem. Improving.    Plan: s/p transfusion 5/3. Heme asked to see; they recommend BMBx    UTI (urinary tract infection) -Established problem. Stable.    Plan: cont on empiric abx until sens back on Cx    Back pain  Plan: therapy on board    Sepsis secondary to UTI (HCC)    Jaundice -Established problem. Stable.  2/2 etoh?  Plan: GI asked to eval; EGD/colon recommended      Case discussed with: vinay  Tests ordered/reviewed: cbc, bmp, LFTs, procal, UCx          (Please note that portions of this note were completed with a voice recognition program.  Efforts were made to edit the dictations but occasionally words are mis-transcribed.)        Luigi Mejía MD  5/5/2024    Please use [employer] to contact me with any questions during the day.   The hospitalist service will provide cross-coverage for this patient from 7pm to 7am.    During those hours, contact the on-call hospitalist MD/MIGUEL ANGEL for questions.

## 2024-05-05 NOTE — PLAN OF CARE
Problem: Discharge Planning  Goal: Discharge to home or other facility with appropriate resources  5/5/2024 0552 by Geneva Zuluaga, RN  Outcome: Progressing  Flowsheets (Taken 5/4/2024 1930)  Discharge to home or other facility with appropriate resources:   Identify barriers to discharge with patient and caregiver   Arrange for needed discharge resources and transportation as appropriate   Identify discharge learning needs (meds, wound care, etc)   Refer to discharge planning if patient needs post-hospital services based on physician order or complex needs related to functional status, cognitive ability or social support system     Problem: Pain  Goal: Verbalizes/displays adequate comfort level or baseline comfort level  5/5/2024 0552 by Geneva Zuluaga, RN  Outcome: Progressing  Flowsheets (Taken 5/4/2024 1930)  Verbalizes/displays adequate comfort level or baseline comfort level:   Encourage patient to monitor pain and request assistance   Assess pain using appropriate pain scale   Administer analgesics based on type and severity of pain and evaluate response   Implement non-pharmacological measures as appropriate and evaluate response   Consider cultural and social influences on pain and pain management     Problem: Safety - Adult  Goal: Free from fall injury  5/5/2024 0552 by Geneva Zuluaga, RN  Outcome: Progressing  Flowsheets (Taken 5/4/2024 1930)  Free From Fall Injury: Instruct family/caregiver on patient safety     Problem: ABCDS Injury Assessment  Goal: Absence of physical injury  Outcome: Progressing  Flowsheets (Taken 5/4/2024 1930)  Absence of Physical Injury: Implement safety measures based on patient assessment

## 2024-05-06 ENCOUNTER — APPOINTMENT (OUTPATIENT)
Dept: CT IMAGING | Age: 49
DRG: 720 | End: 2024-05-06
Attending: INTERNAL MEDICINE
Payer: COMMERCIAL

## 2024-05-06 ENCOUNTER — ANESTHESIA EVENT (OUTPATIENT)
Dept: ENDOSCOPY | Age: 49
End: 2024-05-06
Payer: MEDICAID

## 2024-05-06 ENCOUNTER — ANESTHESIA (OUTPATIENT)
Dept: ENDOSCOPY | Age: 49
End: 2024-05-06
Payer: MEDICAID

## 2024-05-06 LAB
ALBUMIN SERPL ELPH-MCNC: 2.2 G/DL (ref 3.1–4.9)
ALBUMIN SERPL-MCNC: 2.6 G/DL (ref 3.4–5)
ALBUMIN/GLOB SERPL: 0.8 {RATIO} (ref 1.1–2.2)
ALP SERPL-CCNC: 753 U/L (ref 40–129)
ALPHA1 GLOB SERPL ELPH-MCNC: 0.4 G/DL (ref 0.2–0.4)
ALPHA2 GLOB SERPL ELPH-MCNC: 1 G/DL (ref 0.4–1.1)
ALT SERPL-CCNC: 24 U/L (ref 10–40)
ANION GAP SERPL CALCULATED.3IONS-SCNC: 12 MMOL/L (ref 3–16)
ANISOCYTOSIS BLD QL SMEAR: ABNORMAL
AST SERPL-CCNC: 107 U/L (ref 15–37)
B-GLOBULIN SERPL ELPH-MCNC: 0.8 G/DL (ref 0.9–1.6)
BASOPHILS # BLD: 0 K/UL (ref 0–0.2)
BASOPHILS NFR BLD: 0 %
BILIRUB SERPL-MCNC: 3.3 MG/DL (ref 0–1)
BUN SERPL-MCNC: 4 MG/DL (ref 7–20)
CALCIUM SERPL-MCNC: 8.2 MG/DL (ref 8.3–10.6)
CHLORIDE SERPL-SCNC: 101 MMOL/L (ref 99–110)
CO2 SERPL-SCNC: 23 MMOL/L (ref 21–32)
CREAT SERPL-MCNC: <0.5 MG/DL (ref 0.6–1.1)
DEPRECATED RDW RBC AUTO: 18.4 % (ref 12.4–15.4)
EOSINOPHIL # BLD: 0.8 K/UL (ref 0–0.6)
EOSINOPHIL NFR BLD: 3 %
GAMMA GLOB SERPL ELPH-MCNC: 1.1 G/DL (ref 0.6–1.8)
GFR SERPLBLD CREATININE-BSD FMLA CKD-EPI: >90 ML/MIN/{1.73_M2}
GLUCOSE SERPL-MCNC: 87 MG/DL (ref 70–99)
HCG SERPL QL: NEGATIVE
HCT VFR BLD AUTO: 24.1 % (ref 36–48)
HGB BLD-MCNC: 7.8 G/DL (ref 12–16)
IGA SERPL-MCNC: 189 MG/DL (ref 70–400)
IGG SERPL-MCNC: 825 MG/DL (ref 700–1600)
IGM SERPL-MCNC: 157 MG/DL (ref 40–230)
LDH SERPL L TO P-CCNC: 342 U/L (ref 100–190)
LYMPHOCYTES # BLD: 3.5 K/UL (ref 1–5.1)
LYMPHOCYTES NFR BLD: 13 %
MAGNESIUM SERPL-MCNC: 1.8 MG/DL (ref 1.8–2.4)
MCH RBC QN AUTO: 29.9 PG (ref 26–34)
MCHC RBC AUTO-ENTMCNC: 32.4 G/DL (ref 31–36)
MCV RBC AUTO: 92.4 FL (ref 80–100)
MONOCYTES # BLD: 1.1 K/UL (ref 0–1.3)
MONOCYTES NFR BLD: 4 %
MYELOCYTES NFR BLD MANUAL: 1 %
NEUTROPHILS # BLD: 21.3 K/UL (ref 1.7–7.7)
NEUTROPHILS NFR BLD: 71 %
NEUTS BAND NFR BLD MANUAL: 8 % (ref 0–7)
PLATELET # BLD AUTO: 289 K/UL (ref 135–450)
PLATELET BLD QL SMEAR: ADEQUATE
PMV BLD AUTO: 8.1 FL (ref 5–10.5)
POLYCHROMASIA BLD QL SMEAR: ABNORMAL
POTASSIUM SERPL-SCNC: 3.6 MMOL/L (ref 3.5–5.1)
PROT SERPL-MCNC: 5.5 G/DL (ref 6.4–8.2)
PROT SERPL-MCNC: 5.7 G/DL (ref 6.4–8.2)
RBC # BLD AUTO: 2.61 M/UL (ref 4–5.2)
SLIDE REVIEW: ABNORMAL
SODIUM SERPL-SCNC: 136 MMOL/L (ref 136–145)
TOXIC GRANULES BLD QL SMEAR: PRESENT
WBC # BLD AUTO: 26.6 K/UL (ref 4–11)

## 2024-05-06 PROCEDURE — 1200000000 HC SEMI PRIVATE

## 2024-05-06 PROCEDURE — 85025 COMPLETE CBC W/AUTO DIFF WBC: CPT

## 2024-05-06 PROCEDURE — 07DR3ZX EXTRACTION OF ILIAC BONE MARROW, PERCUTANEOUS APPROACH, DIAGNOSTIC: ICD-10-PCS | Performed by: RADIOLOGY

## 2024-05-06 PROCEDURE — 0DB48ZX EXCISION OF ESOPHAGOGASTRIC JUNCTION, VIA NATURAL OR ARTIFICIAL OPENING ENDOSCOPIC, DIAGNOSTIC: ICD-10-PCS | Performed by: INTERNAL MEDICINE

## 2024-05-06 PROCEDURE — 88311 DECALCIFY TISSUE: CPT

## 2024-05-06 PROCEDURE — 83615 LACTATE (LD) (LDH) ENZYME: CPT

## 2024-05-06 PROCEDURE — 7100000001 HC PACU RECOVERY - ADDTL 15 MIN: Performed by: INTERNAL MEDICINE

## 2024-05-06 PROCEDURE — 2060000000 HC ICU INTERMEDIATE R&B

## 2024-05-06 PROCEDURE — 3609012400 HC EGD TRANSORAL BIOPSY SINGLE/MULTIPLE: Performed by: INTERNAL MEDICINE

## 2024-05-06 PROCEDURE — 6360000002 HC RX W HCPCS: Performed by: INTERNAL MEDICINE

## 2024-05-06 PROCEDURE — 2709999900 HC NON-CHARGEABLE SUPPLY: Performed by: INTERNAL MEDICINE

## 2024-05-06 PROCEDURE — 6360000002 HC RX W HCPCS: Performed by: RADIOLOGY

## 2024-05-06 PROCEDURE — 6370000000 HC RX 637 (ALT 250 FOR IP): Performed by: INTERNAL MEDICINE

## 2024-05-06 PROCEDURE — 88184 FLOWCYTOMETRY/ TC 1 MARKER: CPT

## 2024-05-06 PROCEDURE — 88185 FLOWCYTOMETRY/TC ADD-ON: CPT

## 2024-05-06 PROCEDURE — 3609010600 HC COLONOSCOPY POLYPECTOMY SNARE/COLD BIOPSY: Performed by: INTERNAL MEDICINE

## 2024-05-06 PROCEDURE — 30233N1 TRANSFUSION OF NONAUTOLOGOUS RED BLOOD CELLS INTO PERIPHERAL VEIN, PERCUTANEOUS APPROACH: ICD-10-PCS | Performed by: INTERNAL MEDICINE

## 2024-05-06 PROCEDURE — 36415 COLL VENOUS BLD VENIPUNCTURE: CPT

## 2024-05-06 PROCEDURE — 3700000001 HC ADD 15 MINUTES (ANESTHESIA): Performed by: INTERNAL MEDICINE

## 2024-05-06 PROCEDURE — 88360 TUMOR IMMUNOHISTOCHEM/MANUAL: CPT

## 2024-05-06 PROCEDURE — 2500000003 HC RX 250 WO HCPCS: Performed by: NURSE ANESTHETIST, CERTIFIED REGISTERED

## 2024-05-06 PROCEDURE — 83735 ASSAY OF MAGNESIUM: CPT

## 2024-05-06 PROCEDURE — 079T3ZX DRAINAGE OF BONE MARROW, PERCUTANEOUS APPROACH, DIAGNOSTIC: ICD-10-PCS | Performed by: RADIOLOGY

## 2024-05-06 PROCEDURE — 88313 SPECIAL STAINS GROUP 2: CPT

## 2024-05-06 PROCEDURE — 84075 ASSAY ALKALINE PHOSPHATASE: CPT

## 2024-05-06 PROCEDURE — 0DBK8ZX EXCISION OF ASCENDING COLON, VIA NATURAL OR ARTIFICIAL OPENING ENDOSCOPIC, DIAGNOSTIC: ICD-10-PCS | Performed by: INTERNAL MEDICINE

## 2024-05-06 PROCEDURE — 6360000002 HC RX W HCPCS: Performed by: NURSE ANESTHETIST, CERTIFIED REGISTERED

## 2024-05-06 PROCEDURE — 80053 COMPREHEN METABOLIC PANEL: CPT

## 2024-05-06 PROCEDURE — 84080 ASSAY ALKALINE PHOSPHATASES: CPT

## 2024-05-06 PROCEDURE — 0DBM8ZX EXCISION OF DESCENDING COLON, VIA NATURAL OR ARTIFICIAL OPENING ENDOSCOPIC, DIAGNOSTIC: ICD-10-PCS | Performed by: INTERNAL MEDICINE

## 2024-05-06 PROCEDURE — 7100000000 HC PACU RECOVERY - FIRST 15 MIN: Performed by: INTERNAL MEDICINE

## 2024-05-06 PROCEDURE — 3700000000 HC ANESTHESIA ATTENDED CARE: Performed by: INTERNAL MEDICINE

## 2024-05-06 PROCEDURE — 2720000010 CT BIOPSY BONE MARROW

## 2024-05-06 PROCEDURE — 88305 TISSUE EXAM BY PATHOLOGIST: CPT

## 2024-05-06 PROCEDURE — 84703 CHORIONIC GONADOTROPIN ASSAY: CPT

## 2024-05-06 PROCEDURE — 2580000003 HC RX 258: Performed by: INTERNAL MEDICINE

## 2024-05-06 RX ORDER — DEXMEDETOMIDINE HYDROCHLORIDE 100 UG/ML
INJECTION, SOLUTION INTRAVENOUS PRN
Status: DISCONTINUED | OUTPATIENT
Start: 2024-05-06 | End: 2024-05-06 | Stop reason: SDUPTHER

## 2024-05-06 RX ORDER — MIDAZOLAM HYDROCHLORIDE 2 MG/2ML
INJECTION, SOLUTION INTRAMUSCULAR; INTRAVENOUS PRN
Status: COMPLETED | OUTPATIENT
Start: 2024-05-06 | End: 2024-05-06

## 2024-05-06 RX ORDER — GLYCOPYRROLATE 0.2 MG/ML
INJECTION INTRAMUSCULAR; INTRAVENOUS PRN
Status: DISCONTINUED | OUTPATIENT
Start: 2024-05-06 | End: 2024-05-06 | Stop reason: SDUPTHER

## 2024-05-06 RX ORDER — PROPOFOL 10 MG/ML
INJECTION, EMULSION INTRAVENOUS PRN
Status: DISCONTINUED | OUTPATIENT
Start: 2024-05-06 | End: 2024-05-06 | Stop reason: SDUPTHER

## 2024-05-06 RX ORDER — MIDAZOLAM HYDROCHLORIDE 1 MG/ML
INJECTION INTRAMUSCULAR; INTRAVENOUS PRN
Status: DISCONTINUED | OUTPATIENT
Start: 2024-05-06 | End: 2024-05-06 | Stop reason: SDUPTHER

## 2024-05-06 RX ORDER — FENTANYL CITRATE 50 UG/ML
INJECTION, SOLUTION INTRAMUSCULAR; INTRAVENOUS PRN
Status: COMPLETED | OUTPATIENT
Start: 2024-05-06 | End: 2024-05-06

## 2024-05-06 RX ORDER — HYDROMORPHONE HYDROCHLORIDE 1 MG/ML
0.5 INJECTION, SOLUTION INTRAMUSCULAR; INTRAVENOUS; SUBCUTANEOUS
Status: DISCONTINUED | OUTPATIENT
Start: 2024-05-06 | End: 2024-05-09 | Stop reason: HOSPADM

## 2024-05-06 RX ADMIN — MORPHINE SULFATE 1 MG: 2 INJECTION, SOLUTION INTRAMUSCULAR; INTRAVENOUS at 05:59

## 2024-05-06 RX ADMIN — DICLOFENAC SODIUM 2 G: 10 GEL TOPICAL at 14:02

## 2024-05-06 RX ADMIN — SODIUM CHLORIDE: 9 INJECTION, SOLUTION INTRAVENOUS at 00:17

## 2024-05-06 RX ADMIN — MIDAZOLAM HYDROCHLORIDE 1 MG: 1 INJECTION, SOLUTION INTRAMUSCULAR; INTRAVENOUS at 09:41

## 2024-05-06 RX ADMIN — FENTANYL CITRATE 50 MCG: 50 INJECTION, SOLUTION INTRAMUSCULAR; INTRAVENOUS at 09:48

## 2024-05-06 RX ADMIN — DICLOFENAC SODIUM 2 G: 10 GEL TOPICAL at 20:19

## 2024-05-06 RX ADMIN — PROPOFOL 40 MG: 10 INJECTION, EMULSION INTRAVENOUS at 11:28

## 2024-05-06 RX ADMIN — SODIUM CHLORIDE, PRESERVATIVE FREE 10 ML: 5 INJECTION INTRAVENOUS at 14:02

## 2024-05-06 RX ADMIN — PEG-3350, SODIUM SULFATE, SODIUM CHLORIDE, POTASSIUM CHLORIDE, SODIUM ASCORBATE AND ASCORBIC ACID 100 G: KIT at 00:18

## 2024-05-06 RX ADMIN — ENOXAPARIN SODIUM 40 MG: 100 INJECTION SUBCUTANEOUS at 20:18

## 2024-05-06 RX ADMIN — WATER 1000 MG: 1 INJECTION INTRAMUSCULAR; INTRAVENOUS; SUBCUTANEOUS at 16:54

## 2024-05-06 RX ADMIN — FENTANYL CITRATE 50 MCG: 50 INJECTION, SOLUTION INTRAMUSCULAR; INTRAVENOUS at 09:45

## 2024-05-06 RX ADMIN — Medication 100 MG: at 16:54

## 2024-05-06 RX ADMIN — SODIUM CHLORIDE, PRESERVATIVE FREE 10 ML: 5 INJECTION INTRAVENOUS at 20:18

## 2024-05-06 RX ADMIN — MIDAZOLAM 2 MG: 1 INJECTION INTRAMUSCULAR; INTRAVENOUS at 11:10

## 2024-05-06 RX ADMIN — DEXMEDETOMIDINE HYDROCHLORIDE 2 MCG: 100 INJECTION, SOLUTION INTRAVENOUS at 11:11

## 2024-05-06 RX ADMIN — MORPHINE SULFATE 1 MG: 2 INJECTION, SOLUTION INTRAMUSCULAR; INTRAVENOUS at 16:54

## 2024-05-06 RX ADMIN — GLYCOPYRROLATE 0.2 MG: 0.2 INJECTION, SOLUTION INTRAMUSCULAR; INTRAVENOUS at 11:08

## 2024-05-06 RX ADMIN — HYDROMORPHONE HYDROCHLORIDE 0.5 MG: 1 INJECTION, SOLUTION INTRAMUSCULAR; INTRAVENOUS; SUBCUTANEOUS at 22:00

## 2024-05-06 RX ADMIN — MIDAZOLAM HYDROCHLORIDE 1 MG: 1 INJECTION, SOLUTION INTRAMUSCULAR; INTRAVENOUS at 09:48

## 2024-05-06 RX ADMIN — DEXMEDETOMIDINE HYDROCHLORIDE 2 MCG: 100 INJECTION, SOLUTION INTRAVENOUS at 11:09

## 2024-05-06 RX ADMIN — MIDAZOLAM HYDROCHLORIDE 1 MG: 1 INJECTION, SOLUTION INTRAMUSCULAR; INTRAVENOUS at 09:45

## 2024-05-06 RX ADMIN — PROPOFOL 50 MG: 10 INJECTION, EMULSION INTRAVENOUS at 11:12

## 2024-05-06 RX ADMIN — MORPHINE SULFATE 1 MG: 2 INJECTION, SOLUTION INTRAMUSCULAR; INTRAVENOUS at 12:20

## 2024-05-06 RX ADMIN — PROPOFOL 100 MG: 10 INJECTION, EMULSION INTRAVENOUS at 11:15

## 2024-05-06 RX ADMIN — PROPOFOL 100 MCG/KG/MIN: 10 INJECTION, EMULSION INTRAVENOUS at 11:14

## 2024-05-06 ASSESSMENT — PAIN DESCRIPTION - ORIENTATION
ORIENTATION: RIGHT
ORIENTATION: RIGHT;LEFT
ORIENTATION: RIGHT

## 2024-05-06 ASSESSMENT — PAIN DESCRIPTION - LOCATION
LOCATION: LEG

## 2024-05-06 ASSESSMENT — PAIN - FUNCTIONAL ASSESSMENT
PAIN_FUNCTIONAL_ASSESSMENT: 0-10
PAIN_FUNCTIONAL_ASSESSMENT: ACTIVITIES ARE NOT PREVENTED
PAIN_FUNCTIONAL_ASSESSMENT: NONE - DENIES PAIN
PAIN_FUNCTIONAL_ASSESSMENT: ACTIVITIES ARE NOT PREVENTED

## 2024-05-06 ASSESSMENT — PAIN DESCRIPTION - DESCRIPTORS
DESCRIPTORS: ACHING
DESCRIPTORS: ACHING
DESCRIPTORS: CRAMPING;PATIENT UNABLE TO DESCRIBE
DESCRIPTORS: SHARP
DESCRIPTORS: CRAMPING
DESCRIPTORS: ACHING

## 2024-05-06 ASSESSMENT — PAIN SCALES - GENERAL
PAINLEVEL_OUTOF10: 9
PAINLEVEL_OUTOF10: 6
PAINLEVEL_OUTOF10: 8
PAINLEVEL_OUTOF10: 9
PAINLEVEL_OUTOF10: 9
PAINLEVEL_OUTOF10: 8
PAINLEVEL_OUTOF10: 9
PAINLEVEL_OUTOF10: 9

## 2024-05-06 ASSESSMENT — PAIN DESCRIPTION - PAIN TYPE: TYPE: ACUTE PAIN

## 2024-05-06 NOTE — H&P
Gastroenterology Outpatient History and Physical    Patient: Eneida Berman MRN: [id number]  Sex: female    YOB: 1975  Age: 49 y.o.  Location: [facility]     Date:5/6/2024  Primary Care Physician: Doug Dalal MD         Patient: Eneida Berman    Physician: Radha Michaels MD    Vital Signs: /72   Pulse 94   Temp 97.3 °F (36.3 °C) (Temporal)   Resp 20   Ht 1.651 m (5' 5\")   Wt 101.6 kg (224 lb)   SpO2 100%   BMI 37.28 kg/m²     Allergies:   Allergies   Allergen Reactions    Latex Itching       History of Present Illness: Comes in with profound anemia, denies overt GI bleeding, has history of chronic alcohol abuse.  Elevated alkaline phosphatase, liver source versus bone marrow.  Normocytic anemia, normal iron indicis or anemia of chronic disease versus anemia secondary to severe bone marrow suppression from chronic alcohol use.  Could also be anemia related to hematologic pathology.  EGD colonoscopy for further evaluation    History:  Past Medical History:   Diagnosis Date    Anxiety     Migraine       Past Surgical History:   Procedure Laterality Date    CT BONE MARROW BIOPSY  5/6/2024    CT BONE MARROW BIOPSY 5/6/2024 [facility] CT SCAN    ECTOPIC PREGNANCY SURGERY      right tube removed      Social History     Socioeconomic History    Marital status:      Spouse name: None    Number of children: None    Years of education: None    Highest education level: None   Tobacco Use    Smoking status: Never    Smokeless tobacco: Never   Substance and Sexual Activity    Alcohol use: Not Currently     Comment: half a bottle every other day    Drug use: No     Social Determinants of Health     Food Insecurity: No Food Insecurity (5/3/2024)    Hunger Vital Sign     Worried About Running Out of Food in the Last Year: Never true     Ran Out of Food in the Last Year: Never true   Transportation Needs: No Transportation Needs (5/3/2024)    PRAPARE - Transportation      Lack of Transportation (Medical): No     Lack of Transportation (Non-Medical): No   Housing Stability: Low Risk  (5/3/2024)    Housing Stability Vital Sign     Unable to Pay for Housing in the Last Year: No     Number of Places Lived in the Last Year: 1     Unstable Housing in the Last Year: No      History reviewed. No pertinent family history.    Medications:   Prior to Admission medications    Medication Sig Start Date End Date Taking? Authorizing Provider   acetaminophen (TYLENOL) 325 MG tablet Take 2 tablets by mouth every 6 hours as needed for Pain   Yes Darrius Breaux MD   ibuprofen (ADVIL;MOTRIN) 200 MG tablet Take 2 tablets by mouth every 6 hours as needed for Pain   Yes Darrius Breaux MD   Naproxen Sodium (ALEVE) 220 MG CAPS Take 1 capsule by mouth 2 times daily as needed for Pain   Yes Darrius Breaux MD   alprazolam (XANAX) 2 MG tablet Take 2 mg by mouth daily.    Patient not taking: Reported on 5/3/2024    Darrius Breaux MD   metoprolol (TOPROL-XL) 25 MG XL tablet Take 25 mg by mouth daily.    Patient not taking: Reported on 5/3/2024    Darrius Breaux MD       Review of Systems:  Weight Loss: No  Dysphagia: No  Dyspepsia: No    Physical Exam:   Heart: within normal limits   Lungs: clear to auscultation in anterior lung fields bilaterally   Mental status:  Within normal limits     ASA score:  asa3    Mallimpati score:  2    Planned Procedure: egd/colon with mac    Signed By: Radha Michaels MD     May 6, 2024

## 2024-05-06 NOTE — PROGRESS NOTES
Patient urinated before she left for pregnancy screen prior to endo procedure, this RN verified with MD that it was okay that tele monitor be removed prior to bone marrow bx as patient will go straight to endo after procedure

## 2024-05-06 NOTE — BRIEF OP NOTE
Brief Postoperative Note    Eneida Berman  YOB: 1975  [id number]    Pre-operative Diagnosis: anemia    Post-operative Diagnosis: Same    Procedure: CT-guided bone marrow biopsy    Anesthesia: Moderate Sedation    Surgeons: Albert Funes MD    Estimated Blood Loss: Less than 5 mL    Complications: None    Specimens: Was Obtained: 12cc bone marrow aspirate and core    Findings: Successful CT-guided bone marrow biospy.    Electronically signed by Albert Funes MD on 5/6/2024 at 9:51 AM

## 2024-05-06 NOTE — PRE SEDATION
Sedation Pre-Procedure Note    Patient Name: Eneida Berman   YOB: 1975  Room/Bed: [facility]-3379/3379-01  Medical Record Number: [id number]  Date: 5/6/2024   Time: 9:28 AM       Indication:  Bone marrow biopsy.    Consent: I have discussed with the patient and/or the patient representative the indication, alternatives, and the possible risks and/or complications of the planned procedure and the anesthesia methods. The patient and/or patient representative appear to understand and agree to proceed.    Vital Signs:   Vitals:    05/06/24 0754   BP: 127/82   Pulse: (!) 105   Resp: 16   Temp: 98.8 °F (37.1 °C)   SpO2:        Past Medical History:   has a past medical history of Anxiety and Migraine.    Past Surgical History:   has a past surgical history that includes Ectopic pregnancy surgery.    Medications:   Scheduled Meds:    diclofenac sodium  2 g Topical BID    pantoprazole  40 mg Oral QAM AC    sodium chloride flush  5-40 mL IntraVENous 2 times per day    enoxaparin  40 mg SubCUTAneous Nightly    cefTRIAXone (ROCEPHIN) IV  1,000 mg IntraVENous Q24H    thiamine  100 mg Oral Daily     Continuous Infusions:    sodium chloride 100 mL/hr at 05/06/24 0017    sodium chloride       PRN Meds: morphine, sodium chloride flush, sodium chloride, ondansetron **OR** ondansetron, acetaminophen **OR** acetaminophen, magnesium hydroxide, hydrALAZINE, potassium chloride **OR** potassium alternative oral replacement **OR** potassium chloride, sodium chloride, magnesium sulfate, sodium chloride  Home Meds:   Prior to Admission medications    Medication Sig Start Date End Date Taking? Authorizing Provider   acetaminophen (TYLENOL) 325 MG tablet Take 2 tablets by mouth every 6 hours as needed for Pain   Yes ProviderDarrius MD   ibuprofen (ADVIL;MOTRIN) 200 MG tablet Take 2 tablets by mouth every 6 hours as needed for Pain   Yes ProviderDarrius MD   Naproxen Sodium (ALEVE) 220 MG CAPS Take 1 capsule by mouth 2

## 2024-05-06 NOTE — PROCEDURES
EGD and Colonoscopy Procedure  Note            Patient: Eneida Berman  : 1975  CSN: [id number]    Procedure: Esophagogastroduodenoscopy with biopsy and Colonoscopy with cold snare polypectomy.    Date:  2024     Surgeon:  Radha Michaels MD     Referring Physician:  Doug Dalal MD    Preoperative Diagnosis:  Anemia, unspecified type [D64.9]    Postoperative Diagnosis: The GEJ was located at 40 cm, nodular granulation tissue noted at the GE junction, biopsies obtained to rule out Berman's esophagus.  No erosive esophagitis.  No esophageal varices noted, smooth distal esophageal mucosa noted.  No gastric varices noted on retroflexion in the gastric fundus.  No portal gastropathy.    Normal-appearing gastric mucosa.  Normal duodenum.  No fresh or old blood noted anywhere in the upper GI TRACT.      Mucosa throughout the colon and terminal ileum was normal, no fresh or old blood noted in the colon.  No source of bleeding identified.  5 polyps ranging in size from 8 mm to 10 mm removed by cold snare polypectomy from the ascending colon.  1 polyp 10 mm removed by the descending colon cold snare polypectomy, medium internal hemorrhoids    Anesthesia:  MAC    EBL: minimal to none    Indications: This is a 49 y.o. year old female who presents today with anemia and jaundice, history of alcohol abuse.  Anemia is normocytic, iron indicis favor anemia of chronic disease, patient denies overt GI bleeding, no history of vegan diet, blood donation or postmenopausal bleeding  Has history of chronic alcohol abuse.  Elevated alkaline phosphatase, liver source versus bone marrow.  Normocytic anemia, normal iron indicis or anemia of chronic disease versus anemia secondary to severe bone marrow suppression from chronic alcohol use.  Could also be anemia related to hematologic pathology.  EGD AND colonoscopy for further evaluation      Procedure Details:    The Olympus video endoscope was passed through the

## 2024-05-06 NOTE — PROGRESS NOTES
Teaching / education initiated regarding perioperative experience, expectations, and pain management during stay. Patient verbalized understanding. Mom at bedside.   Electronically signed by Domenica Pleitez RN on 5/6/2024 at 10:25 AM

## 2024-05-06 NOTE — PROGRESS NOTES
Progress Note - Dr. Mejía - Internal Medicine  PCP: Doug Dalal MD [id number] [facility] / [facility] [id number] [phone]    Hospital Day: 3  Code Status: Full Code  Current Diet: Diet NPO Exceptions are: Sips of Water with Meds, Ice Chips        CC: follow up on medical issues    Subjective:   Eneida Berman is a 49 y.o. female.    Pt seen and examined  Chart reviewed since last visit, labs and imaging below      Still with pain  E coli on ucx    Awaiting egd/colon, BMBx - both tentatively today      Review of Systems: (1 system for EPF, 2-9 for detailed, 10+ for comprehensive)  Constitutional: Negative for chills and fever.     HENT: Negative for dental problem, nosebleeds and rhinorrhea.      Eyes: Negative for photophobia and visual disturbance.     Respiratory: Negative for cough, chest tightness and shortness of breath.      Cardiovascular: Negative for chest pain and leg swelling.     Gastrointestinal: Negative for diarrhea, nausea and vomiting.     Endocrine: Negative for polydipsia and polyphagia.     Genitourinary: Negative for frequency, hematuria and urgency.     Musculoskeletal: positive for back pain and myalgias.     Skin: Negative for rash.     Allergic/Immunologic: Negative for food allergies.     Neurological: Negative for dizziness, seizures, syncope and facial asymmetry.  Positive for R leg pain, weakness  Hematological: Negative for adenopathy.     Psychiatric/Behavioral: Negative for dysphoric mood. The patient is not nervous/anxious.              I have reviewed the patient's medical and social history in detail and updated the computerized patient record.  To recap: She  has a past medical history of Anxiety and Migraine.. She  has a past surgical history that includes Ectopic pregnancy surgery.. She  reports that she has never smoked. She has never used smokeless tobacco. She reports that she does not currently use alcohol. She reports that she does not use drugs..        Active  tenderness/mass/nodules     Respiratory: clear to auscultation and percussion bilaterally with normal respiratory effort    Cardiovascular: normal rate, regular rhythm, normal S1 and S2 and no murmurs    Gastrointestinal: soft, mild diffusely tender, non-distended, normal bowel sounds, no masses or organomegaly    Extremities: no clubbing, no edema    Skin:No rashes or nodules noted. +janudice    Neurologic:negative         Labs:  Lab Results   Component Value Date    WBC 26.6 (H) 05/06/2024    HGB 7.8 (L) 05/06/2024    HCT 24.1 (L) 05/06/2024     05/06/2024    ALT 24 05/06/2024     (H) 05/06/2024     05/06/2024    K 3.6 05/06/2024     05/06/2024    CREATININE <0.5 (L) 05/06/2024    BUN 4 (L) 05/06/2024    CO2 23 05/06/2024    TSH 5.05 (H) 05/03/2024    INR 1.08 05/03/2024     Lab Results   Component Value Date    CKTOTAL 72 11/29/2018    TROPONINI <0.01 03/25/2020       Recent Imaging Results are Reviewed:  CT ABDOMEN PELVIS W IV CONTRAST Additional Contrast? None    Result Date: 5/3/2024  EXAMINATION: CT OF THE ABDOMEN AND PELVIS WITH CONTRAST 5/3/2024 2:53 pm TECHNIQUE: CT of the abdomen and pelvis was performed with the administration of intravenous contrast. Multiplanar reformatted images are provided for review. Automated exposure control, iterative reconstruction, and/or weight based adjustment of the mA/kV was utilized to reduce the radiation dose to as low as reasonably achievable. COMPARISON: None. HISTORY: Acute right upper quadrant pain and jaundice.  Alcohol abuse. FINDINGS: Patient is slightly rotated and there is some motion artifact. Lower Chest: Atelectasis/scarring at the anterior right lung base. Organs: Hepatomegaly with moderate-severe steatosis.  2-3 mm right renal stone.  Small right renal cyst; no follow-up imaging recommended.  Remaining solid organs and the gallbladder are unremarkable. GI/Bowel: Mild sigmoid diverticulosis without acute inflammation.  Remainder of

## 2024-05-06 NOTE — PROGRESS NOTES
Physical and Occupational Therapy  Eneida Berman  1975    Attempted to see patient for PT/OT session. Patient is currently ALBERTA. Will follow up as schedule allows.     Myla Sánchez PT, DPT [id number]  Martínez Shanks, MOT OTR/L [id number]

## 2024-05-06 NOTE — ANESTHESIA POSTPROCEDURE EVALUATION
Department of Anesthesiology  Postprocedure Note    Patient: Eneida Berman  MRN: [id number]  YOB: 1975  Date of evaluation: 5/6/2024    Procedure Summary       Date: 05/06/24 Room / Location: [place] [address] / [facility]    Anesthesia Start: 1108 Anesthesia Stop: 1153    Procedures:       ESOPHAGOGASTRODUODENOSCOPY BIOPSY (Abdomen)      COLONOSCOPY POLYPECTOMY SNARE BIOPSY Diagnosis:       Anemia, unspecified type      (Anemia, unspecified type [D64.9])    Surgeons: Radha Michaels MD Responsible Provider: Cornel Doll MD    Anesthesia Type: MAC ASA Status: 3            Anesthesia Type: MAC    Steven Phase I: Steven Score: 10    Steven Phase II:      Anesthesia Post Evaluation    Patient location during evaluation: PACU  Patient participation: complete - patient participated  Level of consciousness: awake  Airway patency: patent  Nausea & Vomiting: no vomiting  Cardiovascular status: hemodynamically stable  Respiratory status: acceptable  Hydration status: euvolemic  There was medical reason for not using a multimodal analgesia pain management approach.Pain management: adequate    No notable events documented.

## 2024-05-06 NOTE — PROGRESS NOTES
ONCOLOGY HEMATOLOGY CARE PROGRESS NOTE      SUBJECTIVE:    Feels okay.  No chest pain or shortness of breath.  Back pain is okay  No external bleeding  Fatigue +      ROS:       OBJECTIVE        Physical    VITALS:  Patient Vitals for the past 24 hrs:   BP Temp Temp src Pulse Resp SpO2 Weight   05/06/24 0315 126/63 99 °F (37.2 °C) Oral 98 16 97 % --   05/05/24 2345 133/82 99.3 °F (37.4 °C) Oral 95 16 92 % --   05/05/24 2007 (!) 148/88 99.1 °F (37.3 °C) Oral 94 16 97 % --   05/05/24 1650 -- -- -- -- 17 -- --   05/05/24 1615 128/84 98.8 °F (37.1 °C) Oral 96 16 95 % --   05/05/24 1224 120/81 98.8 °F (37.1 °C) Oral 96 17 94 % --   05/05/24 1045 -- -- -- -- 17 -- --   05/05/24 0955 -- -- -- -- -- -- 102.5 kg (225 lb 14.4 oz)   05/05/24 0830 130/83 98.8 °F (37.1 °C) Oral 97 16 92 % --         24HR INTAKE/OUTPUT:    Intake/Output Summary (Last 24 hours) at 5/6/2024 0644  Last data filed at 5/5/2024 2350  Gross per 24 hour   Intake 2217.96 ml   Output 800 ml   Net 1417.96 ml         Conscious alert oriented.    Appears comfortable.  Pallor +  No neck fullness.    Respiratory efforts are normal.    Abdomen is not distended.    No leg edema    No focal deficits.    DATA:  CBC:    Recent Labs     05/06/24  0530 05/05/24  0511 05/04/24  0631 05/03/24  2240 05/03/24  1325   WBC 26.6* 20.0* 17.7*  --  18.1*   NEUTROABS  --  16.0* 15.2*  --  15.5*   LYMPHOPCT  --  19.0 10.0  --  11.4   RBC 2.61* 2.50* 2.45*  --  1.60*   HGB 7.8* 7.8* 7.7* 5.6* 5.1*   HCT 24.1* 22.6* 21.8* 16.9* 15.1*   MCV 92.4 90.2 88.9  --  94.4   MCH 29.9 31.1 31.2  --  31.9   MCHC 32.4 34.5 35.1  --  33.9   RDW 18.4* 18.6* 18.0*  --  14.2    245 184  --  202         PT/INR:    Recent Labs     05/03/24  2114   INR 1.08       PTT:  No results for input(s): \"APTT\" in the last 720 hours.    CMP:    Recent Labs     05/06/24  0530 05/05/24  0511 05/04/24  0858 05/04/24  0631 05/03/24  2240 05/03/24  1325    134*  --  Mild  atherosclerotic disease without abdominal aortic aneurysm.    Bones/Soft Tissues: Unremarkable.  Impression: Hepatomegaly with advanced steatosis.  Small right renal stone without  obstructive uropathy.  Mild sigmoid diverticulosis without acute  diverticulitis.  MRI LUMBAR SPINE WO CONTRAST  Narrative: EXAMINATION:  MRI OF THE LUMBAR SPINE WITHOUT CONTRAST, 5/3/2024 2:26 pm    TECHNIQUE:  Multiplanar multisequence MRI of the lumbar spine was performed without the  administration of intravenous contrast.    COMPARISON:  None    HISTORY:  ORDERING SYSTEM PROVIDED HISTORY: groin numbness, RLE weakness assess for  cord compression  TECHNOLOGIST PROVIDED HISTORY:  Reason for exam:->groin numbness, RLE weakness assess for cord compression  Decision Support Exception - unselect if not a suspected or confirmed  emergency medical condition->Emergency Medical Condition (MA)    Initial evaluation    FINDINGS:  BONES/ALIGNMENT: The curvature of the lumbar spine is within normal limits.  The height of the lumbar vertebral bodies are maintained.  There is a  transitional vertebral body with an S1 disc. Correlation with plain films  prior to surgical intervention is suggested.  For the sake of this dictation,  the lowest well-formed vertebral body is being called S1.  There is minimal  spondylolisthesis of L5 on S1.    There are Modic type 2 degenerative endplate changes at L3-L4 and L4-L5.  There is disc space narrowing and osteophytes at L2-L3, L3-L4, L4-L5, and  L5-S1.    The marrow signal of the vertebral bodies are low on T1-weighted images.  This is a nonspecific finding.  This sometimes can be normal in a young  patient.  Sometimes anemia or marrow infiltrative processes can have this  finding.  A patient in an immunocompromised state can have this appearance to  the marrow. Clinically correlate.    There is question of high signal in the right sacral ala.  An insufficiency  fracture or other abnormality of the

## 2024-05-06 NOTE — CARE COORDINATION
Chart reviewed for discharge planning    CM/SW has continued to follow patient progress to anticipate potential discharge needs. At this time, patient is not ready for discharge.       Inpatient day #- 3    Barrier(s) to discharge-patient- awaiting results of bone marrow biopsy and EGD/Colonoscopy     Tentative discharge plan- Home with spouse, possible C or SNF, pending insurance acceptance (Medicaid)    Tentative discharge date- unknown at this time    *Case management will continue to follow progress and update discharge plan as needed.     Valerie Mccall RNCM

## 2024-05-06 NOTE — PROGRESS NOTES
Pt meets criteria to be transported back to .  Waiting on transport.  Report called to Pam HILL RN.  All questions answered

## 2024-05-07 ENCOUNTER — APPOINTMENT (OUTPATIENT)
Dept: VASCULAR LAB | Age: 49
DRG: 720 | End: 2024-05-07
Attending: INTERNAL MEDICINE
Payer: COMMERCIAL

## 2024-05-07 LAB
ALBUMIN SERPL-MCNC: 2.6 G/DL (ref 3.4–5)
ALBUMIN/GLOB SERPL: 0.9 {RATIO} (ref 1.1–2.2)
ALP SERPL-CCNC: 686 U/L (ref 40–129)
ALT SERPL-CCNC: 24 U/L (ref 10–40)
ANION GAP SERPL CALCULATED.3IONS-SCNC: 13 MMOL/L (ref 3–16)
ANISOCYTOSIS BLD QL SMEAR: ABNORMAL
AST SERPL-CCNC: 77 U/L (ref 15–37)
BACTERIA BLD CULT ORG #2: NORMAL
BACTERIA BLD CULT: NORMAL
BASOPHILS # BLD: 0 K/UL (ref 0–0.2)
BASOPHILS NFR BLD: 0 %
BILIRUB SERPL-MCNC: 2.6 MG/DL (ref 0–1)
BUN SERPL-MCNC: 4 MG/DL (ref 7–20)
CALCIUM SERPL-MCNC: 8.5 MG/DL (ref 8.3–10.6)
CHLORIDE SERPL-SCNC: 103 MMOL/L (ref 99–110)
CO2 SERPL-SCNC: 21 MMOL/L (ref 21–32)
CREAT SERPL-MCNC: <0.5 MG/DL (ref 0.6–1.1)
DEPRECATED RDW RBC AUTO: 18.5 % (ref 12.4–15.4)
EOSINOPHIL # BLD: 0.6 K/UL (ref 0–0.6)
EOSINOPHIL NFR BLD: 3 %
GFR SERPLBLD CREATININE-BSD FMLA CKD-EPI: >90 ML/MIN/{1.73_M2}
GLUCOSE SERPL-MCNC: 88 MG/DL (ref 70–99)
HCT VFR BLD AUTO: 24 % (ref 36–48)
HGB BLD-MCNC: 7.7 G/DL (ref 12–16)
KAPPA LC FREE SER-MCNC: 21 MG/L (ref 3.3–19.4)
KAPPA LC FREE/LAMBDA FREE SER: 1.06 {RATIO} (ref 0.26–1.65)
LAMBDA LC FREE SERPL-MCNC: 19.75 MG/L (ref 5.71–26.3)
LYMPHOCYTES # BLD: 1.5 K/UL (ref 1–5.1)
LYMPHOCYTES NFR BLD: 8 %
MCH RBC QN AUTO: 30 PG (ref 26–34)
MCHC RBC AUTO-ENTMCNC: 32 G/DL (ref 31–36)
MCV RBC AUTO: 93.7 FL (ref 80–100)
MONOCYTES # BLD: 0.4 K/UL (ref 0–1.3)
MONOCYTES NFR BLD: 2 %
MYELOCYTES NFR BLD MANUAL: 2 %
NEUTROPHILS # BLD: 16 K/UL (ref 1.7–7.7)
NEUTROPHILS NFR BLD: 78 %
NEUTS BAND NFR BLD MANUAL: 7 % (ref 0–7)
NRBC BLD-RTO: 2 /100 WBC
PLATELET # BLD AUTO: 297 K/UL (ref 135–450)
PLATELET BLD QL SMEAR: ADEQUATE
PMV BLD AUTO: 8.1 FL (ref 5–10.5)
POLYCHROMASIA BLD QL SMEAR: ABNORMAL
POTASSIUM SERPL-SCNC: 3.8 MMOL/L (ref 3.5–5.1)
PROT SERPL-MCNC: 5.6 G/DL (ref 6.4–8.2)
RBC # BLD AUTO: 2.56 M/UL (ref 4–5.2)
RPT COMMENT: ABNORMAL
SLIDE REVIEW: ABNORMAL
SODIUM SERPL-SCNC: 137 MMOL/L (ref 136–145)
TOXIC GRANULES BLD QL SMEAR: PRESENT
WBC # BLD AUTO: 18.4 K/UL (ref 4–11)

## 2024-05-07 PROCEDURE — 36415 COLL VENOUS BLD VENIPUNCTURE: CPT

## 2024-05-07 PROCEDURE — 93970 EXTREMITY STUDY: CPT

## 2024-05-07 PROCEDURE — 6370000000 HC RX 637 (ALT 250 FOR IP): Performed by: INTERNAL MEDICINE

## 2024-05-07 PROCEDURE — 80053 COMPREHEN METABOLIC PANEL: CPT

## 2024-05-07 PROCEDURE — 2580000003 HC RX 258: Performed by: INTERNAL MEDICINE

## 2024-05-07 PROCEDURE — 6360000002 HC RX W HCPCS: Performed by: INTERNAL MEDICINE

## 2024-05-07 PROCEDURE — 1200000000 HC SEMI PRIVATE

## 2024-05-07 PROCEDURE — 85025 COMPLETE CBC W/AUTO DIFF WBC: CPT

## 2024-05-07 RX ADMIN — ACETAMINOPHEN 650 MG: 325 TABLET ORAL at 17:36

## 2024-05-07 RX ADMIN — DICLOFENAC SODIUM 2 G: 10 GEL TOPICAL at 10:36

## 2024-05-07 RX ADMIN — HYDROMORPHONE HYDROCHLORIDE 0.5 MG: 1 INJECTION, SOLUTION INTRAMUSCULAR; INTRAVENOUS; SUBCUTANEOUS at 20:04

## 2024-05-07 RX ADMIN — SODIUM CHLORIDE, PRESERVATIVE FREE 10 ML: 5 INJECTION INTRAVENOUS at 20:07

## 2024-05-07 RX ADMIN — HYDROMORPHONE HYDROCHLORIDE 0.5 MG: 1 INJECTION, SOLUTION INTRAMUSCULAR; INTRAVENOUS; SUBCUTANEOUS at 01:16

## 2024-05-07 RX ADMIN — WATER 1000 MG: 1 INJECTION INTRAMUSCULAR; INTRAVENOUS; SUBCUTANEOUS at 17:29

## 2024-05-07 RX ADMIN — SODIUM CHLORIDE, PRESERVATIVE FREE 10 ML: 5 INJECTION INTRAVENOUS at 10:36

## 2024-05-07 RX ADMIN — HYDROMORPHONE HYDROCHLORIDE 0.5 MG: 1 INJECTION, SOLUTION INTRAMUSCULAR; INTRAVENOUS; SUBCUTANEOUS at 05:27

## 2024-05-07 RX ADMIN — PANTOPRAZOLE SODIUM 40 MG: 40 TABLET, DELAYED RELEASE ORAL at 05:27

## 2024-05-07 RX ADMIN — ENOXAPARIN SODIUM 40 MG: 100 INJECTION SUBCUTANEOUS at 20:06

## 2024-05-07 RX ADMIN — HYDROMORPHONE HYDROCHLORIDE 0.5 MG: 1 INJECTION, SOLUTION INTRAMUSCULAR; INTRAVENOUS; SUBCUTANEOUS at 10:36

## 2024-05-07 RX ADMIN — Medication 100 MG: at 10:36

## 2024-05-07 RX ADMIN — DICLOFENAC SODIUM 2 G: 10 GEL TOPICAL at 20:13

## 2024-05-07 ASSESSMENT — PAIN DESCRIPTION - ORIENTATION
ORIENTATION: RIGHT;LEFT
ORIENTATION: RIGHT
ORIENTATION: RIGHT;LEFT;UPPER
ORIENTATION: RIGHT

## 2024-05-07 ASSESSMENT — PAIN SCALES - GENERAL
PAINLEVEL_OUTOF10: 7
PAINLEVEL_OUTOF10: 6
PAINLEVEL_OUTOF10: 9
PAINLEVEL_OUTOF10: 8
PAINLEVEL_OUTOF10: 5
PAINLEVEL_OUTOF10: 3

## 2024-05-07 ASSESSMENT — PAIN DESCRIPTION - LOCATION
LOCATION: FOOT
LOCATION: LEG
LOCATION: LEG
LOCATION: FOOT
LOCATION: LEG;FOOT

## 2024-05-07 ASSESSMENT — PAIN DESCRIPTION - DESCRIPTORS: DESCRIPTORS: ACHING

## 2024-05-07 ASSESSMENT — PAIN - FUNCTIONAL ASSESSMENT: PAIN_FUNCTIONAL_ASSESSMENT: ACTIVITIES ARE NOT PREVENTED

## 2024-05-07 NOTE — PLAN OF CARE
Problem: Discharge Planning  Goal: Discharge to home or other facility with appropriate resources  5/6/2024 2330 by Arden Uribe, RN  Outcome: Progressing  5/6/2024 1721 by Pam Henson, RN  Outcome: Progressing     Problem: Pain  Goal: Verbalizes/displays adequate comfort level or baseline comfort level  5/6/2024 2330 by Arden Uribe, RN  Outcome: Progressing  5/6/2024 1721 by Pam Henson, RN  Outcome: Progressing

## 2024-05-07 NOTE — PLAN OF CARE
Problem: Discharge Planning  Goal: Discharge to home or other facility with appropriate resources  5/7/2024 0142 by Karen Medina, RN  Outcome: Progressing       Problem: Pain  Goal: Verbalizes/displays adequate comfort level or baseline comfort level  5/7/2024 0142 by Karen Medina, RN  Outcome: Progressing       Problem: Safety - Adult  Goal: Free from fall injury  5/7/2024 0142 by Karen Medina, RN  Outcome: Progressing       Problem: ABCDS Injury Assessment  Goal: Absence of physical injury  5/7/2024 0142 by Kaern Medina, RN  Outcome: Progressing

## 2024-05-07 NOTE — PROGRESS NOTES
Physical Therapy  Eneida Berman  Pt on PT caseload. Prior to approach, noting OT note indicating pt with increased LE edema and pain. Orders in place for BLE dopplers to R/O possible DVT. Pt currently ALBERTA.    PT will follow-up with pt as schedule and medical status allows.    Thank you,  Eloy Cooper, PT, DPT, [id number]

## 2024-05-07 NOTE — PROGRESS NOTES
Occupational & Physical Therapy  Eneida Berman     Pt with increased B LE pain and edema, pending B LE dopplers. Will hold for test results/updated POC.    Belgica Guzman MOT OTR/L [id number]

## 2024-05-07 NOTE — PROGRESS NOTES
Gastroenterology Progress Note      Eneida Berman is a 49 y.o. female patient.  1. Alcohol abuse    2. Hypokalemia    3. Hyperbilirubinemia    4. Anemia, unspecified type    5. Acute cystitis without hematuria    6. Edema of both legs        SUBJECTIVE:          Physical    VITALS:  /82   Pulse 94   Temp 98.9 °F (37.2 °C) (Oral)   Resp 17   Ht 1.651 m (5' 5\")   Wt 102.5 kg (225 lb 14.4 oz)   SpO2 97%   BMI 37.59 kg/m²   TEMPERATURE:  Current - Temp: 98.9 °F (37.2 °C); Max - Temp  Av °F (36.7 °C)  Min: 97.5 °F (36.4 °C)  Max: 98.9 °F (37.2 °C)    NAD  RRR, Nl s1s2  Lungs CTA Bilaterally, normal effort  Abdomen soft, ND, NT, no HSM, Bowel sounds normal  AAOx3, No asterixis     Data    Data Review:    Recent Labs     24  0541   WBC 20.0* 26.6* 18.4*   HGB 7.8* 7.8* 7.7*   HCT 22.6* 24.1* 24.0*   MCV 90.2 92.4 93.7    289 297     Recent Labs     24  0524  0541   * 136 137   K 3.7 3.6 3.8   CL 97* 101 103   CO2 28 23 21   BUN 6* 4* 4*   CREATININE <0.5* <0.5* <0.5*     Recent Labs     2430 24  0541   AST 81* 107* 77*   ALT 20 24 24   BILITOT 3.2* 3.3* 2.6*   ALKPHOS 817* 753* 686*     No results for input(s): \"LIPASE\", \"AMYLASE\" in the last 72 hours.  No results for input(s): \"PROTIME\", \"INR\" in the last 72 hours.  Invalid input(s): \"PTT\"       Path    A. GE junction biopsy:      - Benign gastroesophageal mucosa with mild to moderate acute and        chronic inflammation      - Negative for intestinal (goblet cell) metaplasia, dysplasia or        malignancy.        B. Ascending colon polyps x 5:      - Multiple fragments of tubular adenomas; negative for high-grade        dysplasia or malignancy.        C. Descending colon polyp x 1:      - Tubular adenoma; negative for high-grade dysplasia or malignancy       ASSESSMENT / PLAN      Jaundice-  likely from alcohol use but has

## 2024-05-07 NOTE — PROGRESS NOTES
Pt c/o R foot/leg pain with BLE edema that gets worse and causing hard time on ambulating. Pt requests to have some imaging/scans done this am for the R leg/foot.

## 2024-05-07 NOTE — PROGRESS NOTES
Nutrition Note    RECOMMENDATIONS  PO Diet: Continue current diet   ONS: Offer strawberry Ensure Plus High Protein BID  Nutrition Support: None      ASSESSMENT   Pt triggered positive nursing nutrition screen for MST 2. Pt reports decreased appetite and PO intake over the past 2-3 weeks. Over the past week, pt's  says she has \"barely eaten at all.\" Pt attributes this to nausea and dry heaving. PO intake during admission has been less than 50% of meals. Pt with no weight loss;  lbs, pt reports UBW is 226 lbs. No muscle wasting or fat loss noted. Agreeable to trial strawberry Ensure Plus High Protein BID.       Malnutrition Status: No malnutrition    NUTRITION DIAGNOSIS   Inadequate protein-energy intake related to altered GI function as evidenced by nausea, poor intake prior to admission    Goals: PO intake 50% or greater, prior to discharge     NUTRITION RELATED FINDINGS  Objective: LBM 5/2. +3 RLE edema. +2 LLE edema.  Wounds: None    CURRENT NUTRITION THERAPIES  ADULT DIET; Regular; Low Sodium (2 gm)     PO Intake: 1-25%, 26-50%   PO Supplement Intake:None Ordered    ANTHROPOMETRICS  Current Height: 165.1 cm (5' 5\")  Current Weight - Scale: 102.5 kg (225 lb 14.4 oz)    Ideal Body Weight (IBW): 125 lbs  (57 kg)        BMI: 37.6    COMPARATIVE STANDARDS  Total Energy Requirements (kcals/day): 2306     Protein (g):   grams       Fluid (mL/day):  2306 mL    EDUCATION  Education not indicated     The patient will be monitored per nutrition standards of care. Consult dietitian if additional nutrition interventions are needed prior to RD reassessment.     Myla Mcfarlane MS, RD, LD    Contact: [phone]

## 2024-05-07 NOTE — PROGRESS NOTES
of the mA/kV was utilized to reduce the radiation dose to as low as reasonably achievable. COMPARISON: None. HISTORY: Acute right upper quadrant pain and jaundice.  Alcohol abuse. FINDINGS: Patient is slightly rotated and there is some motion artifact. Lower Chest: Atelectasis/scarring at the anterior right lung base. Organs: Hepatomegaly with moderate-severe steatosis.  2-3 mm right renal stone.  Small right renal cyst; no follow-up imaging recommended.  Remaining solid organs and the gallbladder are unremarkable. GI/Bowel: Mild sigmoid diverticulosis without acute inflammation.  Remainder of the gastrointestinal tract is unremarkable. Pelvis: Bladder and uterus are unremarkable.  No lymphadenopathy or free fluid. Peritoneum/Retroperitoneum: No lymphadenopathy or ascites.  Mild atherosclerotic disease without abdominal aortic aneurysm. Bones/Soft Tissues: Unremarkable.     Hepatomegaly with advanced steatosis.  Small right renal stone without obstructive uropathy.  Mild sigmoid diverticulosis without acute diverticulitis.     MRI LUMBAR SPINE WO CONTRAST    Result Date: 5/3/2024  EXAMINATION: MRI OF THE LUMBAR SPINE WITHOUT CONTRAST, 5/3/2024 2:26 pm TECHNIQUE: Multiplanar multisequence MRI of the lumbar spine was performed without the administration of intravenous contrast. COMPARISON: None HISTORY: ORDERING SYSTEM PROVIDED HISTORY: groin numbness, RLE weakness assess for cord compression TECHNOLOGIST PROVIDED HISTORY: Reason for exam:->groin numbness, RLE weakness assess for cord compression Decision Support Exception - unselect if not a suspected or confirmed emergency medical condition->Emergency Medical Condition (MA) Initial evaluation FINDINGS: BONES/ALIGNMENT: The curvature of the lumbar spine is within normal limits. The height of the lumbar vertebral bodies are maintained.  There is a transitional vertebral body with an S1 disc. Correlation with plain films prior to surgical intervention is suggested.   discussed with: heme  Tests ordered/reviewed: cbc, bmp, LFTs, procal, UCx          (Please note that portions of this note were completed with a voice recognition program.  Efforts were made to edit the dictations but occasionally words are mis-transcribed.)        Luigi Mejía MD  5/7/2024    Please use [employer] to contact me with any questions during the day.   The hospitalist service will provide cross-coverage for this patient from 7pm to 7am.    During those hours, contact the on-call hospitalist MD/MIGUEL ANGEL for questions.

## 2024-05-08 ENCOUNTER — APPOINTMENT (OUTPATIENT)
Dept: GENERAL RADIOLOGY | Age: 49
DRG: 720 | End: 2024-05-08
Payer: COMMERCIAL

## 2024-05-08 LAB
ANION GAP SERPL CALCULATED.3IONS-SCNC: 9 MMOL/L (ref 3–16)
ANISOCYTOSIS BLD QL SMEAR: ABNORMAL
BASOPHILS # BLD: 0 K/UL (ref 0–0.2)
BASOPHILS NFR BLD: 0 %
BUN SERPL-MCNC: 5 MG/DL (ref 7–20)
CALCIUM SERPL-MCNC: 8.4 MG/DL (ref 8.3–10.6)
CHLORIDE SERPL-SCNC: 102 MMOL/L (ref 99–110)
CO2 SERPL-SCNC: 26 MMOL/L (ref 21–32)
CREAT SERPL-MCNC: <0.5 MG/DL (ref 0.6–1.1)
DEPRECATED RDW RBC AUTO: 18.8 % (ref 12.4–15.4)
ECHO BSA: 2.16 M2
EOSINOPHIL # BLD: 0.2 K/UL (ref 0–0.6)
EOSINOPHIL NFR BLD: 1 %
GFR SERPLBLD CREATININE-BSD FMLA CKD-EPI: >90 ML/MIN/{1.73_M2}
GLUCOSE SERPL-MCNC: 98 MG/DL (ref 70–99)
HCT VFR BLD AUTO: 22.1 % (ref 36–48)
HGB BLD-MCNC: 7.1 G/DL (ref 12–16)
LYMPHOCYTES # BLD: 2.7 K/UL (ref 1–5.1)
LYMPHOCYTES NFR BLD: 17 %
MACROCYTES BLD QL SMEAR: ABNORMAL
MCH RBC QN AUTO: 29.9 PG (ref 26–34)
MCHC RBC AUTO-ENTMCNC: 32 G/DL (ref 31–36)
MCV RBC AUTO: 93.5 FL (ref 80–100)
MONOCYTES # BLD: 0 K/UL (ref 0–1.3)
MONOCYTES NFR BLD: 0 %
NEUTROPHILS # BLD: 13 K/UL (ref 1.7–7.7)
NEUTROPHILS NFR BLD: 82 %
NT-PROBNP SERPL-MCNC: 867 PG/ML (ref 0–124)
PLATELET # BLD AUTO: 320 K/UL (ref 135–450)
PLATELET BLD QL SMEAR: ADEQUATE
PMV BLD AUTO: 8 FL (ref 5–10.5)
POLYCHROMASIA BLD QL SMEAR: ABNORMAL
POTASSIUM SERPL-SCNC: 3.4 MMOL/L (ref 3.5–5.1)
RBC # BLD AUTO: 2.37 M/UL (ref 4–5.2)
SLIDE REVIEW: ABNORMAL
SODIUM SERPL-SCNC: 137 MMOL/L (ref 136–145)
SPE/IFE INTERPRETATION: NORMAL
WBC # BLD AUTO: 15.8 K/UL (ref 4–11)

## 2024-05-08 PROCEDURE — 6370000000 HC RX 637 (ALT 250 FOR IP): Performed by: INTERNAL MEDICINE

## 2024-05-08 PROCEDURE — 1200000000 HC SEMI PRIVATE

## 2024-05-08 PROCEDURE — 36415 COLL VENOUS BLD VENIPUNCTURE: CPT

## 2024-05-08 PROCEDURE — 93970 EXTREMITY STUDY: CPT | Performed by: SURGERY

## 2024-05-08 PROCEDURE — 94760 N-INVAS EAR/PLS OXIMETRY 1: CPT

## 2024-05-08 PROCEDURE — 2580000003 HC RX 258: Performed by: INTERNAL MEDICINE

## 2024-05-08 PROCEDURE — 97535 SELF CARE MNGMENT TRAINING: CPT

## 2024-05-08 PROCEDURE — 83880 ASSAY OF NATRIURETIC PEPTIDE: CPT

## 2024-05-08 PROCEDURE — 85025 COMPLETE CBC W/AUTO DIFF WBC: CPT

## 2024-05-08 PROCEDURE — 71045 X-RAY EXAM CHEST 1 VIEW: CPT

## 2024-05-08 PROCEDURE — 97530 THERAPEUTIC ACTIVITIES: CPT

## 2024-05-08 PROCEDURE — 80048 BASIC METABOLIC PNL TOTAL CA: CPT

## 2024-05-08 PROCEDURE — 6360000002 HC RX W HCPCS: Performed by: INTERNAL MEDICINE

## 2024-05-08 RX ORDER — SPIRONOLACTONE 25 MG/1
25 TABLET ORAL DAILY
Status: DISCONTINUED | OUTPATIENT
Start: 2024-05-08 | End: 2024-05-09 | Stop reason: HOSPADM

## 2024-05-08 RX ORDER — FUROSEMIDE 20 MG/1
20 TABLET ORAL DAILY
Status: DISCONTINUED | OUTPATIENT
Start: 2024-05-08 | End: 2024-05-09 | Stop reason: HOSPADM

## 2024-05-08 RX ADMIN — POTASSIUM CHLORIDE 40 MEQ: 1500 TABLET, EXTENDED RELEASE ORAL at 09:24

## 2024-05-08 RX ADMIN — DICLOFENAC SODIUM 2 G: 10 GEL TOPICAL at 21:33

## 2024-05-08 RX ADMIN — HYDROMORPHONE HYDROCHLORIDE 0.5 MG: 1 INJECTION, SOLUTION INTRAMUSCULAR; INTRAVENOUS; SUBCUTANEOUS at 06:48

## 2024-05-08 RX ADMIN — HYDROMORPHONE HYDROCHLORIDE 0.5 MG: 1 INJECTION, SOLUTION INTRAMUSCULAR; INTRAVENOUS; SUBCUTANEOUS at 00:25

## 2024-05-08 RX ADMIN — PANTOPRAZOLE SODIUM 40 MG: 40 TABLET, DELAYED RELEASE ORAL at 06:50

## 2024-05-08 RX ADMIN — SODIUM CHLORIDE, PRESERVATIVE FREE 10 ML: 5 INJECTION INTRAVENOUS at 21:33

## 2024-05-08 RX ADMIN — ENOXAPARIN SODIUM 40 MG: 100 INJECTION SUBCUTANEOUS at 21:33

## 2024-05-08 RX ADMIN — WATER 1000 MG: 1 INJECTION INTRAMUSCULAR; INTRAVENOUS; SUBCUTANEOUS at 16:15

## 2024-05-08 RX ADMIN — HYDROMORPHONE HYDROCHLORIDE 0.5 MG: 1 INJECTION, SOLUTION INTRAMUSCULAR; INTRAVENOUS; SUBCUTANEOUS at 11:50

## 2024-05-08 RX ADMIN — SPIRONOLACTONE 25 MG: 25 TABLET ORAL at 09:24

## 2024-05-08 RX ADMIN — HYDROMORPHONE HYDROCHLORIDE 0.5 MG: 1 INJECTION, SOLUTION INTRAMUSCULAR; INTRAVENOUS; SUBCUTANEOUS at 21:30

## 2024-05-08 RX ADMIN — Medication 100 MG: at 09:24

## 2024-05-08 RX ADMIN — SODIUM CHLORIDE, PRESERVATIVE FREE 10 ML: 5 INJECTION INTRAVENOUS at 09:24

## 2024-05-08 RX ADMIN — DICLOFENAC SODIUM 2 G: 10 GEL TOPICAL at 09:24

## 2024-05-08 RX ADMIN — FUROSEMIDE 20 MG: 20 TABLET ORAL at 09:24

## 2024-05-08 ASSESSMENT — PAIN SCALES - GENERAL
PAINLEVEL_OUTOF10: 5
PAINLEVEL_OUTOF10: 6
PAINLEVEL_OUTOF10: 7
PAINLEVEL_OUTOF10: 4
PAINLEVEL_OUTOF10: 8
PAINLEVEL_OUTOF10: 6

## 2024-05-08 ASSESSMENT — PAIN DESCRIPTION - PAIN TYPE: TYPE: ACUTE PAIN

## 2024-05-08 ASSESSMENT — PAIN DESCRIPTION - ORIENTATION
ORIENTATION_2: LOWER
ORIENTATION: RIGHT;LEFT

## 2024-05-08 ASSESSMENT — PAIN DESCRIPTION - LOCATION
LOCATION: LEG;FOOT;TOE (COMMENT WHICH ONE)
LOCATION: LEG
LOCATION_2: BACK
LOCATION: LEG
LOCATION: LEG

## 2024-05-08 ASSESSMENT — PAIN DESCRIPTION - FREQUENCY: FREQUENCY: INTERMITTENT

## 2024-05-08 ASSESSMENT — PAIN DESCRIPTION - DESCRIPTORS
DESCRIPTORS: SORE;OTHER (COMMENT)
DESCRIPTORS: OTHER (COMMENT);BURNING
DESCRIPTORS_2: SHARP
DESCRIPTORS: BURNING;THROBBING

## 2024-05-08 ASSESSMENT — PAIN DESCRIPTION - INTENSITY: RATING_2: 8

## 2024-05-08 ASSESSMENT — PAIN - FUNCTIONAL ASSESSMENT: PAIN_FUNCTIONAL_ASSESSMENT: ACTIVITIES ARE NOT PREVENTED

## 2024-05-08 ASSESSMENT — PAIN DESCRIPTION - ONSET: ONSET: ON-GOING

## 2024-05-08 NOTE — PROGRESS NOTES
Assessment complete. C/o pain bilateral upper thighs. Dilaudid IV given at 2015. Ambulated to the osman with walker for severe weather warning. Tolerated well. Pitting edema remains bilateral lower extremities.

## 2024-05-08 NOTE — DISCHARGE INSTR - COC
Continuity of Care Form    Patient Name: Eneida Berman   :  1975  MRN:  [id number]    Admit date:  5/3/2024  Discharge date:  2024    Code Status Order: Full Code   Advance Directives:     Admitting Physician:  Luigi Mejía MD  PCP: Doug Dalal MD    Discharging Nurse:   Discharging Hospital Unit/Room#: 3TN-3379/3379-01  Discharging Unit Phone Number: [phone]    Emergency Contact:   Extended Emergency Contact Information  Primary Emergency Contact: ButcherMarylu   [facility]  Home Phone: [phone]  Mobile Phone: [phone]  Relation: Parent    Past Surgical History:  Past Surgical History:   Procedure Laterality Date    COLONOSCOPY N/A 2024    COLONOSCOPY POLYPECTOMY SNARE BIOPSY performed by Radha Michaels MD at [facility] ENDOSCOPY    CT BONE MARROW BIOPSY  2024    CT BONE MARROW BIOPSY 2024 [facility] CT SCAN    ECTOPIC PREGNANCY SURGERY      right tube removed    UPPER GASTROINTESTINAL ENDOSCOPY N/A 2024    ESOPHAGOGASTRODUODENOSCOPY BIOPSY performed by Radha Michaels MD at [facility] ENDOSCOPY       Immunization History:   Immunization History   Administered Date(s) Administered    COVID-19, PFIZER PURPLE top, DILUTE for use, (age 12 y+), 30mcg/0.3mL 2021, 2021, 2021    Influenza, Quadv, 6 mo and older, IM, PF (Flulaval, Fluarix) 2018    TDaP, ADACEL (age 10y-64y), BOOSTRIX (age 10y+), IM, 0.5mL 2011       Active Problems:  Patient Active Problem List   Diagnosis Code    Acute alcohol intoxication (HCC) F10.929    Anxiety F41.9    HTN (hypertension) I10    Hypokalemia E87.6    Acute alcohol intoxication in patient with alcoholism with blood alcohol level over 0.3, with delirium (HCC) F10.221, Y90.0    Alcohol abuse F10.10    Anemia D64.9    UTI (urinary tract infection) N39.0    Sepsis (HCC) A41.9    Back pain M54.9    Sepsis secondary to UTI (HCC) A41.9, N39.0    Jaundice R17       Isolation/Infection:   Isolation

## 2024-05-08 NOTE — PROGRESS NOTES
[facility] - Inpatient Rehabilitation Department   Phone: [phone]    Occupational Therapy    [] Initial Evaluation            [x] Daily Treatment Note         [] Discharge Summary      Patient: Eneida Berman   : 1975   MRN: [id number]   Date of Service:  2024    Admitting Diagnosis:  Sepsis (HCC)  Current Admission Summary: 49 y.o. female who presents to the emergency department with multiple complaints primarily pain starting in the low back and radiating down the right leg into the mid right thigh ongoing for about 3 weeks.  She states that she feels like the right leg is weak and it was difficult to walk secondary to weakness and pain to the right leg.  She states that over the last 3 days she is now noticing numbness in her groin.  Denies trouble controlling bowels or bladder.  She denies any direct injury to her back or leg.  She endorses history of sciatica when she was pregnant but has not been worked up for this back pain in the recent years.  She does not think she is ever had any imaging of her back.  She also endorses abdominal pain to the right upper quadrant.  She is concerned about her liver.  She does have history of alcohol abuse.  Her mother is with her and states that her skin appears more jaundiced today.     MRI lumbar spine showing multiple findings including low marrow signal which could be seen in the setting of anemia. Questionable sacral fracture   Ortho spine asked to review films. Pt has been ambulating to/from bathroom during admission prior to eval, RN approval to see. Up as tolerated orders in chart      Pt noted to be anemia; transfusion ordered by ER, 2 units last night  Pt also with evidence of UTI and meets sepsis criteria    To be admitted for iv abx and fluids as well and pain    Past Medical History:  has a past medical history of Anxiety and Migraine.  Past Surgical History:  has a past surgical history that includes Ectopic pregnancy surgery; CT

## 2024-05-08 NOTE — PROGRESS NOTES
Resting quietly in bed, eyes closed, responded to verbal stimuli, pleasant.  Denies having any back pain or abdominal pain.  Does c/o BLE pain, restless at times.  Gave Dilaudid per prn order.  Also placed kevin pump on legs, may help with pain.  VSS.  Assessment completed, see flow charts.malena

## 2024-05-08 NOTE — CARE COORDINATION
Discharge Planning.     Therapy at this time is recommending the patient return home with home health care. The patient is currently medicaid pending. The SW called Ana with [facility] [phone] about about radha home visits. Ana stated they will review and follow up with the SW if they can accept or not.     Electronically signed by DIOMEDES Sánchez on 5/8/2024 at 3:50 PM

## 2024-05-08 NOTE — PROGRESS NOTES
L3-L4, L4-L5, and L5-S1. The marrow signal of the vertebral bodies are low on T1-weighted images. This is a nonspecific finding.  This sometimes can be normal in a young patient.  Sometimes anemia or marrow infiltrative processes can have this finding.  A patient in an immunocompromised state can have this appearance to the marrow. Clinically correlate. There is question of high signal in the right sacral ala.  An insufficiency fracture or other abnormality of the sacrum cannot be excluded.  This is only partially visualized.  A dedicated MRI of the sacrum could better evaluate for marrow signal abnormalities. SPINAL CORD: The conus terminates normally. 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 SOFT TISSUES: No paraspinal mass identified. L1-L2: There are minimal osteophytes. The thecal sac and neural foramina are intact. L2-L3: There are osteophytes lateral toward the left.  There are osteophytes lateral toward the right.  There is minimal narrowing of the right neural foramen.  The thecal sac and left neural foramen are intact. L3-L4: There is mild diffuse disc bulge.  There are small osteophytes. Disc and/or osteophytes result in mild narrowing of the neural foramina bilaterally.  The thecal sac is not stenotic. There is mild facet and ligamentum flavum hypertrophy. L4-L5: There is mild facet and ligamentum flavum hypertrophy.  The thecal sac is not stenotic.  Disc and/or osteophytes result in mild narrowing of the neural foramina bilaterally.   The left neural foramen is narrowed greater than right. L5-S1: There is moderate facet and ligamentum flavum hypertrophy.  The thecal sac and S1 nerve roots are intact.  The neural foramina are intact.     There is a transitional vertebral body with an S1 disc. Correlation with plain films prior to surgical intervention is suggested. The marrow signal of the vertebral bodies are low on T1-weighted images. This is a nonspecific finding.  This sometimes  MD  5/8/2024    Please use [employer] to contact me with any questions during the day.   The hospitalist service will provide cross-coverage for this patient from 7pm to 7am.    During those hours, contact the on-call hospitalist MD/MIGUEL ANGEL for questions.

## 2024-05-09 VITALS
WEIGHT: 237.5 LBS | OXYGEN SATURATION: 98 % | HEART RATE: 100 BPM | DIASTOLIC BLOOD PRESSURE: 78 MMHG | RESPIRATION RATE: 18 BRPM | HEIGHT: 65 IN | TEMPERATURE: 99.3 F | SYSTOLIC BLOOD PRESSURE: 118 MMHG | BODY MASS INDEX: 39.57 KG/M2

## 2024-05-09 LAB
ALBUMIN SERPL-MCNC: 2.8 G/DL (ref 3.4–5)
ALP SERPL-CCNC: 611 U/L (ref 40–129)
ALT SERPL-CCNC: 23 U/L (ref 10–40)
ANION GAP SERPL CALCULATED.3IONS-SCNC: 12 MMOL/L (ref 3–16)
AST SERPL-CCNC: 77 U/L (ref 15–37)
BASOPHILS # BLD: 0.1 K/UL (ref 0–0.2)
BASOPHILS NFR BLD: 0.6 %
BILIRUB DIRECT SERPL-MCNC: 1.8 MG/DL (ref 0–0.3)
BILIRUB INDIRECT SERPL-MCNC: 0.7 MG/DL (ref 0–1)
BILIRUB SERPL-MCNC: 2.5 MG/DL (ref 0–1)
BUN SERPL-MCNC: 6 MG/DL (ref 7–20)
CALCIUM SERPL-MCNC: 8.5 MG/DL (ref 8.3–10.6)
CHLORIDE SERPL-SCNC: 102 MMOL/L (ref 99–110)
CO2 SERPL-SCNC: 25 MMOL/L (ref 21–32)
CREAT SERPL-MCNC: <0.5 MG/DL (ref 0.6–1.1)
DEPRECATED RDW RBC AUTO: 19 % (ref 12.4–15.4)
EOSINOPHIL # BLD: 0.4 K/UL (ref 0–0.6)
EOSINOPHIL NFR BLD: 2.4 %
GFR SERPLBLD CREATININE-BSD FMLA CKD-EPI: >90 ML/MIN/{1.73_M2}
GLUCOSE SERPL-MCNC: 107 MG/DL (ref 70–99)
HCT VFR BLD AUTO: 21.8 % (ref 36–48)
HGB BLD-MCNC: 7.2 G/DL (ref 12–16)
LYMPHOCYTES # BLD: 2 K/UL (ref 1–5.1)
LYMPHOCYTES NFR BLD: 13.4 %
MCH RBC QN AUTO: 31.5 PG (ref 26–34)
MCHC RBC AUTO-ENTMCNC: 33.2 G/DL (ref 31–36)
MCV RBC AUTO: 95.1 FL (ref 80–100)
MONOCYTES # BLD: 0.7 K/UL (ref 0–1.3)
MONOCYTES NFR BLD: 4.4 %
NEUTROPHILS # BLD: 12 K/UL (ref 1.7–7.7)
NEUTROPHILS NFR BLD: 79.2 %
PLATELET # BLD AUTO: 350 K/UL (ref 135–450)
PMV BLD AUTO: 7.7 FL (ref 5–10.5)
POTASSIUM SERPL-SCNC: 4.2 MMOL/L (ref 3.5–5.1)
PROT SERPL-MCNC: 5.9 G/DL (ref 6.4–8.2)
RBC # BLD AUTO: 2.29 M/UL (ref 4–5.2)
SODIUM SERPL-SCNC: 139 MMOL/L (ref 136–145)
WBC # BLD AUTO: 15.1 K/UL (ref 4–11)

## 2024-05-09 PROCEDURE — 6370000000 HC RX 637 (ALT 250 FOR IP): Performed by: INTERNAL MEDICINE

## 2024-05-09 PROCEDURE — 97530 THERAPEUTIC ACTIVITIES: CPT

## 2024-05-09 PROCEDURE — 97116 GAIT TRAINING THERAPY: CPT

## 2024-05-09 PROCEDURE — 80076 HEPATIC FUNCTION PANEL: CPT

## 2024-05-09 PROCEDURE — 85025 COMPLETE CBC W/AUTO DIFF WBC: CPT

## 2024-05-09 PROCEDURE — 36415 COLL VENOUS BLD VENIPUNCTURE: CPT

## 2024-05-09 PROCEDURE — 6360000002 HC RX W HCPCS: Performed by: INTERNAL MEDICINE

## 2024-05-09 PROCEDURE — 2580000003 HC RX 258: Performed by: INTERNAL MEDICINE

## 2024-05-09 PROCEDURE — 80048 BASIC METABOLIC PNL TOTAL CA: CPT

## 2024-05-09 RX ORDER — FUROSEMIDE 20 MG/1
20 TABLET ORAL DAILY
Qty: 30 TABLET | Refills: 0 | Status: SHIPPED | OUTPATIENT
Start: 2024-05-09

## 2024-05-09 RX ORDER — SPIRONOLACTONE 25 MG/1
25 TABLET ORAL DAILY
Qty: 30 TABLET | Refills: 0 | Status: SHIPPED | OUTPATIENT
Start: 2024-05-09

## 2024-05-09 RX ORDER — CEPHALEXIN 500 MG/1
500 CAPSULE ORAL 3 TIMES DAILY
Qty: 9 CAPSULE | Refills: 0 | Status: SHIPPED | OUTPATIENT
Start: 2024-05-09 | End: 2024-05-12

## 2024-05-09 RX ADMIN — ACETAMINOPHEN 650 MG: 325 TABLET ORAL at 11:40

## 2024-05-09 RX ADMIN — SODIUM CHLORIDE, PRESERVATIVE FREE 10 ML: 5 INJECTION INTRAVENOUS at 09:46

## 2024-05-09 RX ADMIN — FUROSEMIDE 20 MG: 20 TABLET ORAL at 09:46

## 2024-05-09 RX ADMIN — PANTOPRAZOLE SODIUM 40 MG: 40 TABLET, DELAYED RELEASE ORAL at 06:02

## 2024-05-09 RX ADMIN — Medication 100 MG: at 09:46

## 2024-05-09 RX ADMIN — SPIRONOLACTONE 25 MG: 25 TABLET ORAL at 09:46

## 2024-05-09 RX ADMIN — DICLOFENAC SODIUM 2 G: 10 GEL TOPICAL at 09:45

## 2024-05-09 RX ADMIN — SODIUM CHLORIDE, PRESERVATIVE FREE 10 ML: 5 INJECTION INTRAVENOUS at 02:54

## 2024-05-09 RX ADMIN — HYDROMORPHONE HYDROCHLORIDE 0.5 MG: 1 INJECTION, SOLUTION INTRAMUSCULAR; INTRAVENOUS; SUBCUTANEOUS at 02:52

## 2024-05-09 ASSESSMENT — PAIN DESCRIPTION - ORIENTATION: ORIENTATION: RIGHT

## 2024-05-09 ASSESSMENT — PAIN SCALES - GENERAL
PAINLEVEL_OUTOF10: 6
PAINLEVEL_OUTOF10: 4
PAINLEVEL_OUTOF10: 5

## 2024-05-09 ASSESSMENT — PAIN DESCRIPTION - DESCRIPTORS: DESCRIPTORS: ACHING;THROBBING;PINS AND NEEDLES

## 2024-05-09 ASSESSMENT — PAIN DESCRIPTION - LOCATION: LOCATION: FOOT

## 2024-05-09 ASSESSMENT — PAIN - FUNCTIONAL ASSESSMENT: PAIN_FUNCTIONAL_ASSESSMENT: ACTIVITIES ARE NOT PREVENTED

## 2024-05-09 NOTE — PROGRESS NOTES
[facility] - Inpatient Rehabilitation Department   Phone: [phone]    Physical Therapy    [] Initial Evaluation            [x] Daily Treatment Note         [] Discharge Summary      Patient: Eneida Berman   : 1975   MRN: [id number]   Date of Service:  2024  Admitting Diagnosis: Sepsis (HCC)  Current Admission Summary: 49 y.o. female who presents to the emergency department with multiple complaints primarily pain starting in the low back and radiating down the right leg into the mid right thigh ongoing for about 3 weeks.  She states that she feels like the right leg is weak and it was difficult to walk secondary to weakness and pain to the right leg.  She states that over the last 3 days she is now noticing numbness in her groin.  Denies trouble controlling bowels or bladder.  She denies any direct injury to her back or leg.  She endorses history of sciatica when she was pregnant but has not been worked up for this back pain in the recent years.  She does not think she is ever had any imaging of her back.  She also endorses abdominal pain to the right upper quadrant.  She is concerned about her liver.  She does have history of alcohol abuse.  Her mother is with her and states that her skin appears more jaundiced today.     MRI lumbar spine showing multiple findings including low marrow signal which could be seen in the setting of anemia. Questionable sacral fracture   Ortho spine asked to review films     Pt noted to be anemia; transfusion ordered by ER  Pt also with evidence of UTI and meets sepsis criteria  To be admitted for iv abx and fluids as well    Prior to evaluation this morning, pt was ambulating to and from bathroom during her admission.    Past Medical History:  has a past medical history of Anxiety and Migraine.  Past Surgical History:  has a past surgical history that includes Ectopic pregnancy surgery; CT BIOPSY BONE MARROW (2024); Upper gastrointestinal endoscopy

## 2024-05-09 NOTE — DISCHARGE SUMMARY
Discharge education provided to patient. All questions answered with no further to report and AVS given to patient together with three paper prescriptions for lasix, keflex, and spironolactone. All personal belongings gathered and sent down with pt via wheelchair and left in car with her mother.    Vitals:    05/09/24 0252 05/09/24 0747 05/09/24 0826 05/09/24 1154   BP:  120/74  118/78   Pulse:  (!) 102  100   Resp: 16 18     Temp:  98.6 °F (37 °C)  99.3 °F (37.4 °C)   TempSrc:  Oral  Oral   SpO2:  92%  98%   Weight:   107.7 kg (237 lb 8 oz)    Height:           none

## 2024-05-09 NOTE — PROGRESS NOTES
endoscopy (N/A, 5/6/2024); and Colonoscopy (N/A, 5/6/2024).. She  reports that she has never smoked. She has never used smokeless tobacco. She reports that she does not currently use alcohol. She reports that she does not use drugs..        Active Hospital Problems    Diagnosis Date Noted    Alcohol abuse [F10.10] 05/03/2024    Anemia [D64.9] 05/03/2024    UTI (urinary tract infection) [N39.0] 05/03/2024    Sepsis (HCC) [A41.9] 05/03/2024    Back pain [M54.9] 05/03/2024    Sepsis secondary to UTI (HCC) [A41.9, N39.0] 05/03/2024    Jaundice [R17] 05/03/2024    Hypokalemia [E87.6] 11/28/2018       Current Facility-Administered Medications: furosemide (LASIX) tablet 20 mg, 20 mg, Oral, Daily  spironolactone (ALDACTONE) tablet 25 mg, 25 mg, Oral, Daily  HYDROmorphone HCl PF (DILAUDID) injection 0.5 mg, 0.5 mg, IntraVENous, Q3H PRN  diclofenac sodium (VOLTAREN) 1 % gel 2 g, 2 g, Topical, BID  pantoprazole (PROTONIX) tablet 40 mg, 40 mg, Oral, QAM AC  sodium chloride flush 0.9 % injection 5-40 mL, 5-40 mL, IntraVENous, 2 times per day  sodium chloride flush 0.9 % injection 5-40 mL, 5-40 mL, IntraVENous, PRN  0.9 % sodium chloride infusion, , IntraVENous, PRN  enoxaparin (LOVENOX) injection 40 mg, 40 mg, SubCUTAneous, Nightly  ondansetron (ZOFRAN-ODT) disintegrating tablet 4 mg, 4 mg, Oral, Q8H PRN **OR** ondansetron (ZOFRAN) injection 4 mg, 4 mg, IntraVENous, Q6H PRN  acetaminophen (TYLENOL) tablet 650 mg, 650 mg, Oral, Q6H PRN **OR** acetaminophen (TYLENOL) suppository 650 mg, 650 mg, Rectal, Q6H PRN  magnesium hydroxide (MILK OF MAGNESIA) 400 MG/5ML suspension 30 mL, 30 mL, Oral, Daily PRN  cefTRIAXone (ROCEPHIN) 1,000 mg in sterile water 10 mL IV syringe, 1,000 mg, IntraVENous, Q24H  thiamine mononitrate tablet 100 mg, 100 mg, Oral, Daily  hydrALAZINE (APRESOLINE) injection 10 mg, 10 mg, IntraVENous, Q6H PRN  potassium chloride (KLOR-CON M) extended release tablet 40 mEq, 40 mEq, Oral, PRN **OR** potassium  supple, symmetrical, trachea midline and thyroid not enlarged, symmetric, no tenderness/mass/nodules     Respiratory: clear to auscultation and percussion bilaterally with normal respiratory effort    Cardiovascular: normal rate, regular rhythm, normal S1 and S2 and no murmurs    Gastrointestinal: soft, mild diffusely tender, non-distended, normal bowel sounds, no masses or organomegaly    Extremities: no clubbing, no edema    Skin:No rashes or nodules noted. +janudice    Neurologic:negative         Labs:  Lab Results   Component Value Date    WBC 15.1 (H) 05/09/2024    HGB 7.2 (L) 05/09/2024    HCT 21.8 (L) 05/09/2024     05/09/2024    ALT 23 05/09/2024    AST 77 (H) 05/09/2024     05/09/2024    K 4.2 05/09/2024     05/09/2024    CREATININE <0.5 (L) 05/09/2024    BUN 6 (L) 05/09/2024    CO2 25 05/09/2024    TSH 5.05 (H) 05/03/2024    INR 1.08 05/03/2024     Lab Results   Component Value Date    CKTOTAL 72 11/29/2018    TROPONINI <0.01 03/25/2020       Recent Imaging Results are Reviewed:  CT ABDOMEN PELVIS W IV CONTRAST Additional Contrast? None    Result Date: 5/3/2024  EXAMINATION: CT OF THE ABDOMEN AND PELVIS WITH CONTRAST 5/3/2024 2:53 pm TECHNIQUE: CT of the abdomen and pelvis was performed with the administration of intravenous contrast. Multiplanar reformatted images are provided for review. Automated exposure control, iterative reconstruction, and/or weight based adjustment of the mA/kV was utilized to reduce the radiation dose to as low as reasonably achievable. COMPARISON: None. HISTORY: Acute right upper quadrant pain and jaundice.  Alcohol abuse. FINDINGS: Patient is slightly rotated and there is some motion artifact. Lower Chest: Atelectasis/scarring at the anterior right lung base. Organs: Hepatomegaly with moderate-severe steatosis.  2-3 mm right renal stone.  Small right renal cyst; no follow-up imaging recommended.  Remaining solid organs and the gallbladder are unremarkable.

## 2024-05-09 NOTE — CARE COORDINATION
Case Management -  Discharge Note      Patient Name: Eneida Berman                   YOB: 1975            Readmission Risk (Low < 19, Mod (19-27), High > 27): Readmission Risk Score: 12.4    Current PCP: Doug Dalal MD      PT AM-PAC: 18 /24  OT AM-PAC: 18 /24    Patient has PENDING MEDICAID. Pt was recommended for Home Health Care. [facility] will do 3 therapy visits for the Patient 2 PT and 1 OT. CM called Randa with [facility] and made her aware of D/C home today.      spoke with Patient about a [facility] apt due to not having insurance. She advised that was too far.  provided her with a [facility] pamphlet as that is closer to her and they do a sliding scale pay. Patient advised she would call and make the appointment.     Financial    Payor: PENDING MEDICAID / Plan: PENDING MEDICAID / Product Type: *No Product type* /     Pharmacy:  Potential assistance Purchasing Medications: No  Meds-to-Beds request: Yes      [facility] [id number] - [place] - [address] [phone] - F [phone]  [address] [place] [address]  Phone: [phone] Fax: [phone]      Notes:    Additional Case Management Notes: All CM needs met. Family/ Friend to transport Patient home.         Electronically signed by DIOMEDES Ramey on 5/9/2024 at 11:37 AM

## 2024-05-09 NOTE — DISCHARGE SUMMARY
[facility] -- Physician Discharge Summary     Eneida Berman  1975  MRN: [id number]    Admit Date: 5/3/2024  Discharge Date: No discharge date for patient encounter.    Attending MD: Luigi Mejía MD  Discharging MD: Luigi Mejía MD  PCP: Doug Dalal MD [address] [facility] [address] [phone]    Admission Diagnosis: Hypokalemia [E87.6]  Hyperbilirubinemia [E80.6]  Alcohol abuse [F10.10]  Sepsis secondary to UTI (HCC) [A41.9, N39.0]  Acute cystitis without hematuria [N30.00]  Anemia, unspecified type [D64.9]  DISCHARGE DIAGNOSIS:  same    Full Hospital Problem List:  Active Hospital Problems    Diagnosis Date Noted    Alcohol abuse [F10.10] 05/03/2024    Anemia [D64.9] 05/03/2024    UTI (urinary tract infection) [N39.0] 05/03/2024    Sepsis (HCC) [A41.9] 05/03/2024    Back pain [M54.9] 05/03/2024    Sepsis secondary to UTI (HCC) [A41.9, N39.0] 05/03/2024    Jaundice [R17] 05/03/2024    Hypokalemia [E87.6] 11/28/2018           Hospital Course:  49 y.o. female who presents to the emergency department with multiple complaints primarily pain starting in the low back and radiating down the right leg into the mid right thigh ongoing for about 3 weeks.  She states that she feels like the right leg is weak and it was difficult to walk secondary to weakness and pain to the right leg.  She states that over the last 3 days she is now noticing numbness in her groin.  Denies trouble controlling bowels or bladder.  She denies any direct injury to her back or leg.  She endorses history of sciatica when she was pregnant but has not been worked up for this back pain in the recent years.  She does not think she is ever had any imaging of her back.  She also endorses abdominal pain to the right upper quadrant.  She is concerned about her liver.  She does have history of alcohol abuse.  Her mother is with her and states that her skin appears more jaundiced today.    Seen by GI  EGD      Aleve 220 MG Caps  Generic drug: Naproxen Sodium     ibuprofen 200 MG tablet  Commonly known as: ADVIL;MOTRIN     metoprolol succinate 25 MG extended release tablet  Commonly known as: TOPROL XL     Xanax 2 MG tablet  Generic drug: ALPRAZolam               Where to Get Your Medications        You can get these medications from any pharmacy    Bring a paper prescription for each of these medications  cephALEXin 500 MG capsule  furosemide 20 MG tablet  spironolactone 25 MG tablet            Allergies:  Allergies   Allergen Reactions    Latex Itching       Follow up Instructions:  Follow-up with PCP: Doug Dalal MD in 2 wk .      Total time spent on day of discharge including face-to-face visit, examination, documentation, counseling, preparation of discharge plans and followup, and discharge medicine reconciliation and presciptions is 33 minutes    Signed:  Luigi Mejía MD  5/9/2024

## 2024-05-09 NOTE — PROGRESS NOTES
[facility] - Inpatient Rehabilitation Department   Phone: [phone]    Occupational Therapy    [] Initial Evaluation            [x] Daily Treatment Note         [] Discharge Summary      Patient: Eneida Berman   : 1975   MRN: [id number]   Date of Service:  2024    Admitting Diagnosis:  Sepsis (HCC)  Current Admission Summary: 49 y.o. female who presents to the emergency department with multiple complaints primarily pain starting in the low back and radiating down the right leg into the mid right thigh ongoing for about 3 weeks.  She states that she feels like the right leg is weak and it was difficult to walk secondary to weakness and pain to the right leg.  She states that over the last 3 days she is now noticing numbness in her groin.  Denies trouble controlling bowels or bladder.  She denies any direct injury to her back or leg.  She endorses history of sciatica when she was pregnant but has not been worked up for this back pain in the recent years.  She does not think she is ever had any imaging of her back.  She also endorses abdominal pain to the right upper quadrant.  She is concerned about her liver.  She does have history of alcohol abuse.  Her mother is with her and states that her skin appears more jaundiced today.     MRI lumbar spine showing multiple findings including low marrow signal which could be seen in the setting of anemia. Questionable sacral fracture   Ortho spine asked to review films. Pt has been ambulating to/from bathroom during admission prior to eval, RN approval to see. Up as tolerated orders in chart      Pt noted to be anemia; transfusion ordered by ER, 2 units last night  Pt also with evidence of UTI and meets sepsis criteria    To be admitted for iv abx and fluids as well and pain    Past Medical History:  has a past medical history of Anxiety and Migraine.  Past Surgical History:  has a past surgical history that includes Ectopic pregnancy surgery; CT  assistance  Comment: slow pace, moderate UE use on walker, no LOB noted  Balance:  Static Sitting Balance: good: independent with functional balance in unsupported position  Dynamic Sitting Balance: fair (+): maintains balance at SBA/supervision without use of UE support  Static Standing Balance: fair (-): maintains balance at SBA with use of UE support  Dynamic Standing Balance: fair (-): maintains balance at CGA with use of UE support  Comments:    Other Therapeutic Interventions    Functional Outcomes  AM-PAC Inpatient Daily Activity Raw Score: 18                                    Cognition  WFL  Orientation:    alert and oriented x 4  Command Following:   WFL     Education  Barriers To Learning: none  Patient Education: patient educated on goals, OT role and benefits, plan of care, transfer training, discharge recommendations  Learning Assessment:  patient verbalizes and demonstrates understanding    Assessment  Activity Tolerance: good  Impairments Requiring Therapeutic Intervention: decreased functional mobility, decreased ADL status, decreased balance, decreased IADL, increased pain  Prognosis: good  Clinical Assessment: pt not at baseline level of functioning, came to the hospital with R posterior sacral pain and sepsis from UTI. Pt able to tolerate further ambulation this date with SBA with FWW and SBA for ADLs. Pt from home with good family support. Pt would benefit from ongoing skilled OT services in order to maximize independence  Safety Interventions: patient left in chair, call light within reach, gait belt, patient at risk for falls, nurse notified, and no chair alarm noted in chair on arrival, RN aware and okay'd     Plan  Frequency: 3-5 x/per week  Current Treatment Recommendations: strengthening, balance training, functional mobility training, transfer training, ADL/self-care training, IADL training, home management training, pain management, and home exercise program    Goals  Patient Goals: pain

## 2024-05-09 NOTE — PROGRESS NOTES
Secure message sent to Oncology/Hematology (Александр Wang MD) regarding pt's discharge and bone marrow biopsy results.    Responded \"Saw [facility] report - normal. OK to discharge\"

## 2024-05-09 NOTE — PLAN OF CARE
Problem: Discharge Planning  Goal: Discharge to home or other facility with appropriate resources  Outcome: Completed     Problem: Pain  Goal: Verbalizes/displays adequate comfort level or baseline comfort level  Outcome: Adequate for Discharge     Problem: Safety - Adult  Goal: Free from fall injury  Outcome: Completed     Problem: ABCDS Injury Assessment  Goal: Absence of physical injury  Outcome: Completed     Problem: Nutrition Deficit:  Goal: Optimize nutritional status  Outcome: Completed

## 2024-05-10 LAB — MITOCHONDRIA M2 AB SER IA-ACNC: <0.5 U/ML (ref 0–4)

## 2024-05-11 LAB
ALP BONE SERPL-CCNC: 178 U/L (ref 0–55)
ALP ISOS SERPL HS-CCNC: 0 U/L
ALP LIVER SERPL-CCNC: 633 U/L (ref 0–94)
ALP SERPL-CCNC: 811 U/L (ref 40–120)

## 2024-05-21 LAB
Lab: NORMAL
REPORT: NORMAL
THIS TEST SENT TO: NORMAL

## 2024-12-11 NOTE — ED NOTES
Chief Complaint   Patient presents with    Annual Exam    Results     Review of results       \"Have you been to the ER, urgent care clinic since your last visit?  Hospitalized since your last visit?\"    YES - When: approximately 1 months ago.  Where and Why: on 11/06/2024 Urgent Care for pneumonia .    “Have you seen or consulted any other health care providers outside our system since your last visit?”    NO     “Have you had a pap smear?”    NO - [facility]  [facility] - [facility]ed [facility] from [facility]     Date of last Cervical Cancer screen (HPV or PAP): 10/7/2021       “Have you had a colorectal cancer screening such as a colonoscopy/FIT/Cologuard?    NO    No colonoscopy on file  No cologuard on file  No FIT/FOBT on file   No flexible sigmoidoscopy on file    Physician order obtained. Patient completed adult immunization consent form.  Allergies, contraindications and recommendations reviewed with patient. Seasonal influenza vaccine administered IM left deltoid.  Patient tolerated well.  Patient remained in office for 15 minutes after injection and no adverse reactions were noted.     Lot # [id number]  Exp Date: 06/30/2025  NDC # 42512-442-40        This [employer] member spoke with pt who states in past has been in both in-patient and out-patient substance abuse programs. [place]; was able to stay sober until life stressors arose and she relapsed. Pt states, \"I will do really good for awhile then begin to binge drink.\"  Pt denies having a sponsor or sober support person; does not attend AA meetings; reports feels uncomfortable in group settings. Offered resources for recovery treatment centers; pt declines.     Offered information regarding On-line AA meetings; pt agreeable; resource given for [employer], 3-018-RECOVERY; [email or web address] (for link to meetings).  Also provided website information for AA [place] virtual meetings.  Provided patient with [employer] Member contact information.    Damaris Cristina Serves Mir Vrachas   Screening & Referral Specialist

## (undated) DEVICE — BW-412T DISP COMBO CLEANING BRUSH: Brand: SINGLE USE COMBINATION CLEANING BRUSH

## (undated) DEVICE — SOLUTION IV IRRIG WATER 500ML POUR BRL ST 2F7113

## (undated) DEVICE — FORCEPS BX L240CM WRK CHN 2.8MM STD CAP W/ NDL MIC MESH

## (undated) DEVICE — TRAP SPEC RETRV CLR PLAS POLYP IN LN SUCT QUIK CTCH

## (undated) DEVICE — SINGLE USE AIR/WATER, SUCTION AND BIOPSY VALVES SET: Brand: ORCAPOD™

## (undated) DEVICE — SNARE COLD DIAMOND 15MM THIN

## (undated) DEVICE — ENDOSCOPIC KIT 6X3/16 FT COLON W/ 1.1 OZ 2 GWN W/O BRSH

## (undated) DEVICE — AIR/WATER CLEANING ADAPTER FOR OLYMPUS® GI ENDOSCOPE: Brand: BULLDOG®

## (undated) DEVICE — MOUTHPIECE ENDOSCP L CTRL OPN AND SIDE PORTS DISP